# Patient Record
Sex: MALE | Race: WHITE | NOT HISPANIC OR LATINO
[De-identification: names, ages, dates, MRNs, and addresses within clinical notes are randomized per-mention and may not be internally consistent; named-entity substitution may affect disease eponyms.]

---

## 2016-11-15 RX ADMIN — Medication 650 MILLIGRAM(S): at 01:00

## 2017-01-25 ENCOUNTER — APPOINTMENT (OUTPATIENT)
Dept: ELECTROPHYSIOLOGY | Facility: CLINIC | Age: 82
End: 2017-01-25

## 2017-01-25 ENCOUNTER — NON-APPOINTMENT (OUTPATIENT)
Age: 82
End: 2017-01-25

## 2017-01-25 VITALS
DIASTOLIC BLOOD PRESSURE: 71 MMHG | OXYGEN SATURATION: 97 % | WEIGHT: 220 LBS | BODY MASS INDEX: 22.45 KG/M2 | HEART RATE: 60 BPM | SYSTOLIC BLOOD PRESSURE: 138 MMHG

## 2017-05-25 ENCOUNTER — APPOINTMENT (OUTPATIENT)
Dept: ELECTROPHYSIOLOGY | Facility: CLINIC | Age: 82
End: 2017-05-25

## 2017-09-27 ENCOUNTER — APPOINTMENT (OUTPATIENT)
Dept: ELECTROPHYSIOLOGY | Facility: CLINIC | Age: 82
End: 2017-09-27
Payer: MEDICARE

## 2017-09-27 ENCOUNTER — NON-APPOINTMENT (OUTPATIENT)
Age: 82
End: 2017-09-27

## 2017-09-27 VITALS — DIASTOLIC BLOOD PRESSURE: 63 MMHG | HEART RATE: 72 BPM | SYSTOLIC BLOOD PRESSURE: 149 MMHG

## 2017-09-27 PROCEDURE — 93279 PRGRMG DEV EVAL PM/LDLS PM: CPT

## 2018-01-29 ENCOUNTER — APPOINTMENT (OUTPATIENT)
Dept: ELECTROPHYSIOLOGY | Facility: CLINIC | Age: 83
End: 2018-01-29
Payer: MEDICARE

## 2018-01-29 PROCEDURE — 93293 PM PHONE R-STRIP DEVICE EVAL: CPT

## 2018-05-30 ENCOUNTER — APPOINTMENT (OUTPATIENT)
Dept: ELECTROPHYSIOLOGY | Facility: CLINIC | Age: 83
End: 2018-05-30
Payer: MEDICARE

## 2018-05-30 VITALS
WEIGHT: 225 LBS | HEIGHT: 69 IN | SYSTOLIC BLOOD PRESSURE: 155 MMHG | HEART RATE: 61 BPM | BODY MASS INDEX: 33.33 KG/M2 | DIASTOLIC BLOOD PRESSURE: 65 MMHG | OXYGEN SATURATION: 97 %

## 2018-05-30 PROCEDURE — 93279 PRGRMG DEV EVAL PM/LDLS PM: CPT

## 2018-09-06 ENCOUNTER — OUTPATIENT (OUTPATIENT)
Dept: OUTPATIENT SERVICES | Facility: HOSPITAL | Age: 83
LOS: 1 days | End: 2018-09-06
Payer: MEDICARE

## 2018-09-06 ENCOUNTER — APPOINTMENT (OUTPATIENT)
Dept: ELECTROPHYSIOLOGY | Facility: CLINIC | Age: 83
End: 2018-09-06
Payer: MEDICARE

## 2018-09-06 ENCOUNTER — APPOINTMENT (OUTPATIENT)
Dept: CV DIAGNOSITCS | Facility: HOSPITAL | Age: 83
End: 2018-09-06

## 2018-09-06 VITALS
DIASTOLIC BLOOD PRESSURE: 64 MMHG | OXYGEN SATURATION: 98 % | SYSTOLIC BLOOD PRESSURE: 154 MMHG | HEIGHT: 69 IN | WEIGHT: 225 LBS | HEART RATE: 66 BPM | BODY MASS INDEX: 33.33 KG/M2

## 2018-09-06 DIAGNOSIS — I48.91 UNSPECIFIED ATRIAL FIBRILLATION: ICD-10-CM

## 2018-09-06 PROCEDURE — 93306 TTE W/DOPPLER COMPLETE: CPT | Mod: 26

## 2018-09-06 PROCEDURE — 93279 PRGRMG DEV EVAL PM/LDLS PM: CPT

## 2018-09-06 PROCEDURE — 93306 TTE W/DOPPLER COMPLETE: CPT

## 2018-11-12 ENCOUNTER — INPATIENT (INPATIENT)
Facility: HOSPITAL | Age: 83
LOS: 6 days | Discharge: NURSING, REHAB OR LONG-TERM CARE | DRG: 682 | End: 2018-11-19
Attending: INTERNAL MEDICINE | Admitting: INTERNAL MEDICINE
Payer: MEDICARE

## 2018-11-12 VITALS
RESPIRATION RATE: 15 BRPM | TEMPERATURE: 100 F | HEIGHT: 69 IN | WEIGHT: 231.93 LBS | OXYGEN SATURATION: 95 % | DIASTOLIC BLOOD PRESSURE: 51 MMHG | SYSTOLIC BLOOD PRESSURE: 169 MMHG | HEART RATE: 78 BPM

## 2018-11-12 DIAGNOSIS — M62.82 RHABDOMYOLYSIS: ICD-10-CM

## 2018-11-12 LAB
ALBUMIN SERPL ELPH-MCNC: 3.2 G/DL — LOW (ref 3.3–5)
ALP SERPL-CCNC: 87 U/L — SIGNIFICANT CHANGE UP (ref 40–120)
ALT FLD-CCNC: 84 U/L — HIGH (ref 12–78)
ANION GAP SERPL CALC-SCNC: 8 MMOL/L — SIGNIFICANT CHANGE UP (ref 5–17)
APPEARANCE UR: ABNORMAL
APTT BLD: 38.2 SEC — HIGH (ref 27.5–36.3)
AST SERPL-CCNC: 213 U/L — HIGH (ref 15–37)
BACTERIA # UR AUTO: ABNORMAL
BASOPHILS # BLD AUTO: 0 K/UL — SIGNIFICANT CHANGE UP (ref 0–0.2)
BASOPHILS NFR BLD AUTO: 0 % — SIGNIFICANT CHANGE UP (ref 0–2)
BILIRUB SERPL-MCNC: 0.8 MG/DL — SIGNIFICANT CHANGE UP (ref 0.2–1.2)
BILIRUB UR-MCNC: ABNORMAL
BUN SERPL-MCNC: 48 MG/DL — HIGH (ref 7–23)
CALCIUM SERPL-MCNC: 7.9 MG/DL — LOW (ref 8.5–10.1)
CHLORIDE SERPL-SCNC: 100 MMOL/L — SIGNIFICANT CHANGE UP (ref 96–108)
CK MB BLD-MCNC: 0.2 % — SIGNIFICANT CHANGE UP (ref 0–3.5)
CK MB CFR SERPL CALC: 8 NG/ML — HIGH (ref 0–3.6)
CK SERPL-CCNC: 4655 U/L — HIGH (ref 26–308)
CO2 SERPL-SCNC: 27 MMOL/L — SIGNIFICANT CHANGE UP (ref 22–31)
COLOR SPEC: ABNORMAL
CREAT SERPL-MCNC: 1.9 MG/DL — HIGH (ref 0.5–1.3)
DIFF PNL FLD: ABNORMAL
EOSINOPHIL # BLD AUTO: 0 K/UL — SIGNIFICANT CHANGE UP (ref 0–0.5)
EOSINOPHIL NFR BLD AUTO: 0 % — SIGNIFICANT CHANGE UP (ref 0–6)
EPI CELLS # UR: SIGNIFICANT CHANGE UP
GLUCOSE SERPL-MCNC: 99 MG/DL — SIGNIFICANT CHANGE UP (ref 70–99)
GLUCOSE UR QL: NEGATIVE — SIGNIFICANT CHANGE UP
HCT VFR BLD CALC: 36 % — LOW (ref 39–50)
HGB BLD-MCNC: 12.1 G/DL — LOW (ref 13–17)
INR BLD: 2.08 RATIO — HIGH (ref 0.88–1.16)
KETONES UR-MCNC: ABNORMAL
LACTATE SERPL-SCNC: 1.5 MMOL/L — SIGNIFICANT CHANGE UP (ref 0.7–2)
LEUKOCYTE ESTERASE UR-ACNC: ABNORMAL
LYMPHOCYTES # BLD AUTO: 0.5 K/UL — LOW (ref 1–3.3)
LYMPHOCYTES # BLD AUTO: 4 % — LOW (ref 13–44)
MCHC RBC-ENTMCNC: 29.7 PG — SIGNIFICANT CHANGE UP (ref 27–34)
MCHC RBC-ENTMCNC: 33.6 GM/DL — SIGNIFICANT CHANGE UP (ref 32–36)
MCV RBC AUTO: 88.5 FL — SIGNIFICANT CHANGE UP (ref 80–100)
MONOCYTES # BLD AUTO: 0.63 K/UL — SIGNIFICANT CHANGE UP (ref 0–0.9)
MONOCYTES NFR BLD AUTO: 5 % — SIGNIFICANT CHANGE UP (ref 2–14)
NEUTROPHILS # BLD AUTO: 11.48 K/UL — HIGH (ref 1.8–7.4)
NEUTROPHILS NFR BLD AUTO: 51 % — SIGNIFICANT CHANGE UP (ref 43–77)
NITRITE UR-MCNC: NEGATIVE — SIGNIFICANT CHANGE UP
PH UR: 5 — SIGNIFICANT CHANGE UP (ref 5–8)
PLATELET # BLD AUTO: 143 K/UL — LOW (ref 150–400)
POTASSIUM SERPL-MCNC: 4 MMOL/L — SIGNIFICANT CHANGE UP (ref 3.5–5.3)
POTASSIUM SERPL-SCNC: 4 MMOL/L — SIGNIFICANT CHANGE UP (ref 3.5–5.3)
PROT SERPL-MCNC: 7.5 G/DL — SIGNIFICANT CHANGE UP (ref 6–8.3)
PROT UR-MCNC: 25 MG/DL
PROTHROM AB SERPL-ACNC: 24 SEC — HIGH (ref 10–12.9)
RBC # BLD: 4.07 M/UL — LOW (ref 4.2–5.8)
RBC # FLD: 14.1 % — SIGNIFICANT CHANGE UP (ref 10.3–14.5)
RBC CASTS # UR COMP ASSIST: SIGNIFICANT CHANGE UP /HPF (ref 0–4)
SODIUM SERPL-SCNC: 135 MMOL/L — SIGNIFICANT CHANGE UP (ref 135–145)
SP GR SPEC: 1.02 — SIGNIFICANT CHANGE UP (ref 1.01–1.02)
UROBILINOGEN FLD QL: 1
WBC # BLD: 12.62 K/UL — HIGH (ref 3.8–10.5)
WBC # FLD AUTO: 12.62 K/UL — HIGH (ref 3.8–10.5)
WBC UR QL: SIGNIFICANT CHANGE UP

## 2018-11-12 PROCEDURE — 99285 EMERGENCY DEPT VISIT HI MDM: CPT

## 2018-11-12 PROCEDURE — 73522 X-RAY EXAM HIPS BI 3-4 VIEWS: CPT | Mod: 26

## 2018-11-12 PROCEDURE — 99223 1ST HOSP IP/OBS HIGH 75: CPT

## 2018-11-12 PROCEDURE — 72131 CT LUMBAR SPINE W/O DYE: CPT | Mod: 26

## 2018-11-12 PROCEDURE — 72110 X-RAY EXAM L-2 SPINE 4/>VWS: CPT | Mod: 26

## 2018-11-12 PROCEDURE — 93010 ELECTROCARDIOGRAM REPORT: CPT

## 2018-11-12 PROCEDURE — 71045 X-RAY EXAM CHEST 1 VIEW: CPT | Mod: 26

## 2018-11-12 PROCEDURE — 74176 CT ABD & PELVIS W/O CONTRAST: CPT | Mod: 26

## 2018-11-12 RX ORDER — PANTOPRAZOLE SODIUM 20 MG/1
40 TABLET, DELAYED RELEASE ORAL DAILY
Qty: 0 | Refills: 0 | Status: DISCONTINUED | OUTPATIENT
Start: 2018-11-12 | End: 2018-11-19

## 2018-11-12 RX ORDER — ACETAMINOPHEN 500 MG
650 TABLET ORAL ONCE
Qty: 0 | Refills: 0 | Status: COMPLETED | OUTPATIENT
Start: 2018-11-12 | End: 2018-11-12

## 2018-11-12 RX ORDER — ACETAMINOPHEN 500 MG
650 TABLET ORAL EVERY 6 HOURS
Qty: 0 | Refills: 0 | Status: DISCONTINUED | OUTPATIENT
Start: 2018-11-12 | End: 2018-11-19

## 2018-11-12 RX ORDER — CEFTRIAXONE 500 MG/1
1 INJECTION, POWDER, FOR SOLUTION INTRAMUSCULAR; INTRAVENOUS ONCE
Qty: 0 | Refills: 0 | Status: COMPLETED | OUTPATIENT
Start: 2018-11-12 | End: 2018-11-12

## 2018-11-12 RX ORDER — SODIUM CHLORIDE 9 MG/ML
1000 INJECTION INTRAMUSCULAR; INTRAVENOUS; SUBCUTANEOUS ONCE
Qty: 0 | Refills: 0 | Status: COMPLETED | OUTPATIENT
Start: 2018-11-12 | End: 2018-11-12

## 2018-11-12 RX ORDER — SODIUM CHLORIDE 9 MG/ML
1000 INJECTION INTRAMUSCULAR; INTRAVENOUS; SUBCUTANEOUS
Qty: 0 | Refills: 0 | Status: DISCONTINUED | OUTPATIENT
Start: 2018-11-12 | End: 2018-11-13

## 2018-11-12 RX ORDER — LIDOCAINE 4 G/100G
1 CREAM TOPICAL ONCE
Qty: 0 | Refills: 0 | Status: COMPLETED | OUTPATIENT
Start: 2018-11-12 | End: 2018-11-12

## 2018-11-12 RX ORDER — ENOXAPARIN SODIUM 100 MG/ML
30 INJECTION SUBCUTANEOUS DAILY
Qty: 0 | Refills: 0 | Status: DISCONTINUED | OUTPATIENT
Start: 2018-11-12 | End: 2018-11-12

## 2018-11-12 RX ORDER — ENOXAPARIN SODIUM 100 MG/ML
40 INJECTION SUBCUTANEOUS DAILY
Qty: 0 | Refills: 0 | Status: DISCONTINUED | OUTPATIENT
Start: 2018-11-12 | End: 2018-11-19

## 2018-11-12 RX ADMIN — SODIUM CHLORIDE 1000 MILLILITER(S): 9 INJECTION INTRAMUSCULAR; INTRAVENOUS; SUBCUTANEOUS at 16:50

## 2018-11-12 RX ADMIN — SODIUM CHLORIDE 1000 MILLILITER(S): 9 INJECTION INTRAMUSCULAR; INTRAVENOUS; SUBCUTANEOUS at 18:29

## 2018-11-12 RX ADMIN — CEFTRIAXONE 100 GRAM(S): 500 INJECTION, POWDER, FOR SOLUTION INTRAMUSCULAR; INTRAVENOUS at 18:29

## 2018-11-12 RX ADMIN — Medication 650 MILLIGRAM(S): at 18:06

## 2018-11-12 RX ADMIN — LIDOCAINE 1 PATCH: 4 CREAM TOPICAL at 22:38

## 2018-11-12 RX ADMIN — Medication 650 MILLIGRAM(S): at 16:49

## 2018-11-12 RX ADMIN — CEFTRIAXONE 1 GRAM(S): 500 INJECTION, POWDER, FOR SOLUTION INTRAMUSCULAR; INTRAVENOUS at 18:59

## 2018-11-12 RX ADMIN — SODIUM CHLORIDE 1000 MILLILITER(S): 9 INJECTION INTRAMUSCULAR; INTRAVENOUS; SUBCUTANEOUS at 16:52

## 2018-11-12 RX ADMIN — SODIUM CHLORIDE 1000 MILLILITER(S): 9 INJECTION INTRAMUSCULAR; INTRAVENOUS; SUBCUTANEOUS at 17:52

## 2018-11-12 RX ADMIN — SODIUM CHLORIDE 1000 MILLILITER(S): 9 INJECTION INTRAMUSCULAR; INTRAVENOUS; SUBCUTANEOUS at 17:29

## 2018-11-12 RX ADMIN — SODIUM CHLORIDE 200 MILLILITER(S): 9 INJECTION INTRAMUSCULAR; INTRAVENOUS; SUBCUTANEOUS at 18:52

## 2018-11-12 RX ADMIN — SODIUM CHLORIDE 1000 MILLILITER(S): 9 INJECTION INTRAMUSCULAR; INTRAVENOUS; SUBCUTANEOUS at 17:50

## 2018-11-12 RX ADMIN — LIDOCAINE 1 PATCH: 4 CREAM TOPICAL at 16:50

## 2018-11-12 NOTE — CONSULT NOTE ADULT - ASSESSMENT
83 M with a history of HTN, AF on AC, PPM, BPH, chronic venous stasis p/w rhabdo and abn EKG.     - Jm are more indicative of peripheral muscle breakdown.   - Cont IVF  - No signs of ADHF. Lower ext edema chronic.   - Monitor closely for vol ol  - Check cxr    - Currently EKG with AF wtih Diffues TWI.   - Previous EKG is .   - Trend Jm to make sure no signs of plaque rupture.   - Likely EKG changes may be from post pacing T wave changes (cardiac memory)    - Hold off on diuretics.   - Would insert vazquez    - Hold BP meds for now.     - Please continue to maintain strict I/Os, monitor daily weights, Cr, and K.   - Cont Abx.   - Further cardiac workup will depend on clinical course.   - All other workup per primary team. Will followup.

## 2018-11-12 NOTE — H&P ADULT - NSHPREVIEWOFSYSTEMS_GEN_ALL_CORE
awake , denies head aches , complain of back pain , and leg pain and being achy and sob , no n/v , no abd pain , mild sob , no chills , feels cold

## 2018-11-12 NOTE — ED ADULT TRIAGE NOTE - CHIEF COMPLAINT QUOTE
Pt states' I fell on Saturday night and laid on the floor till the next morning. Pt have low back pain.

## 2018-11-12 NOTE — CONSULT NOTE ADULT - ASSESSMENT
83M with PMHx as above, s/p fall, remained on floor overnight, now with vincenzo, rhabdomyolysis. Pt is HD stable, No distress    -No acute ICU needs at this time  -Medical management as per PMD  -Trend CPK's  -Monitor renal function  -Reconsult as needed

## 2018-11-12 NOTE — ED ADULT NURSE NOTE - NSIMPLEMENTINTERV_GEN_ALL_ED
Implemented All Fall with Harm Risk Interventions:  [place] to call system. Call bell, personal items and telephone within reach. Instruct patient to call for assistance. Room bathroom lighting operational. Non-slip footwear when patient is off stretcher. Physically safe environment: no spills, clutter or unnecessary equipment. Stretcher in lowest position, wheels locked, appropriate side rails in place. Provide visual cue, wrist band, yellow gown, etc. Monitor gait and stability. Monitor for mental status changes and reorient to person, place, and time. Review medications for side effects contributing to fall risk. Reinforce activity limits and safety measures with patient and family. Provide visual clues: red socks.

## 2018-11-12 NOTE — CONSULT NOTE ADULT - SUBJECTIVE AND OBJECTIVE BOX
CHIEF COMPLAINT: Patient is a 83y old  Male who presents with a chief complaint of fall abd back pain    HPI: 83 M with a history of HTN, AF on AC, PPM, BPH, chronic venous stasis p/w back pain. He was noted to have had a mechanical fall 2 days ago. He was sitting on the floor for 10-12 hours because of back pain. Then his back pain worsened and he came to ED. He has had chropnic lower ext edema that has not worsened. Denies any chest pain, dyspnea, palpitations, PND, orthopnea, near syncope, syncope, stroke like symptoms. Currently feeling anxious.     EKG: AF with diffuse T wave inversions    REVIEW OF SYSTEMS:   All other review of systems are negative unless indicated above    PAST MEDICAL & SURGICAL HISTORY:  Pacemaker  Atrial fibrillation  BPH (benign prostatic hypertrophy)  Hypertension  repair of fracture of right wrist with hardware  Cardiac pacemaker      SOCIAL HISTORY:  No tobacco, ethanol, or drug abuse.    FAMILY HISTORY:    No family history of acute MI or sudden cardiac death.    MEDICATIONS  (STANDING):  cefTRIAXone   IVPB 1 Gram(s) IV Intermittent Once  sodium chloride 0.9% Bolus 1000 milliLiter(s) IV Bolus once  sodium chloride 0.9%. 1000 milliLiter(s) (200 mL/Hr) IV Continuous <Continuous>    MEDICATIONS  (PRN):      Allergies    lactose (Diarrhea)  penicillin (Hives)    Intolerances        Home meds:  Home Medications:  amlodipine-benazepril 10 mg-20 mg oral capsule: 1 cap(s) orally once a day (12 Nov 2018 15:12)  terazosin 5 mg oral tablet: orally once a day (12 Nov 2018 15:12)  warfarin 4 mg oral tablet: 1 tab(s) orally once a day (12 Nov 2018 15:12)        VITAL SIGNS:   Vital Signs Last 24 Hrs  T(C): 37.9 (12 Nov 2018 15:04), Max: 37.9 (12 Nov 2018 15:04)  T(F): 100.3 (12 Nov 2018 15:04), Max: 100.3 (12 Nov 2018 15:04)  HR: 61 (12 Nov 2018 16:25) (61 - 78)  BP: 123/53 (12 Nov 2018 16:25) (123/53 - 169/51)  BP(mean): --  RR: 20 (12 Nov 2018 16:25) (15 - 20)  SpO2: 94% (12 Nov 2018 16:25) (94% - 95%)    I&O's Summary      On Exam:    Constitutional: NAD, awake   HEENT: Dry Mucous Membranes, Anicteric  Pulmonary: Decreased breath sounds b/l. No rales, crackles or wheeze appreciated.   Cardiovascular: Regular, S1 and S2, No murmurs, rubs, gallops or clicks  Gastrointestinal: Bowel Sounds present, soft, nontender. distended  Lymph: 2+ peripheral edema. No lymphadenopathy.  Skin: No visible rashes or ulcers.  Psych:  Mood & affect appropriate    LABS: All Labs Reviewed:                        12.1 12.62 )-----------( 143      ( 12 Nov 2018 16:27 )             36.0     12 Nov 2018 16:27    135    |  100    |  48     ----------------------------<  99     4.0     |  27     |  1.90     Ca    7.9        12 Nov 2018 16:27    TPro  7.5    /  Alb  3.2    /  TBili  0.8    /  DBili  x      /  AST  213    /  ALT  84     /  AlkPhos  87     12 Nov 2018 16:27    PT/INR - ( 12 Nov 2018 16:27 )   PT: 24.0 sec;   INR: 2.08 ratio         PTT - ( 12 Nov 2018 16:27 )  PTT:38.2 sec  CARDIAC MARKERS ( 12 Nov 2018 16:27 )  .102 ng/mL / x     / 4655 U/L / x     / 8.0 ng/mL      Blood Culture:         RADIOLOGY:    < from: Transthoracic Echocardiogram (09.06.18 @ 10:13) >    Patient name: LACIE SANCHEZ  YOB: 1935   Age: 83 (M)   MR#: [id number]  Study Date: 9/6/2018  Location: O/PSonographer: Annamarie Franks [facility]  Study quality: Technically fair  Referring Physician: JELENA TALBOT MD  Blood Pressure: 151/58 mmHg  Height: 173 cm  Weight: 103 kg  BSA: 2.2 m2  Heart Rate: 80 mmHg  ------------------------------------------------------------------------  PROCEDURE: Transthoracic echocardiogram with 2-D, M-Mode  and complete spectral and color flow Doppler.  INDICATION: Unspecified atrial fibrillation (I48.91)  ------------------------------------------------------------------------  Dimensions:    Normal Values:  LA:     5.0    2.0 - 4.0 cm  Ao:     3.4    2.0 - 3.8 cm  SEPTUM: 1.0    0.6 - 1.2 cm  PWT:    0.9    0.6 - 1.1 cm  LVIDd:  5.2    3.0 - 5.6 cm  LVIDs:  2.6    1.8 - 4.0 cm  Derived variables:  LVMI: 84 g/m2  RWT: 0.34  Fractional short: 50 %  EF (Visual Estimate): 60-65 %  EF (Modified Godinez Rule): 61 %Doppler Peak Velocity  (m/sec): AoV=1.6  ------------------------------------------------------------------------  Observations:  Mitral Valve: Mitral annular calcification, otherwise  normal mitral valve. Minimal mitral regurgitation.  Aortic Valve/Aorta: Aortic valve not well visualized;  appears calcified. Peak transaortic valve gradient equals  10 mm Hg. Minimal aortic regurgitation.  Peak left  ventricular outflow tract gradient equals 5 mm Hg.  Aortic Root: 3.4 cm.  Left Atrium: Severely dilated left atrium.  LA volume index  = 50 cc/m2.  Left Ventricle: Endocardium not well visualized; grossly  normal left ventricular systolic function. Normal left  ventricular internal dimensions and wall thicknesses.  Normal diastolic function  Right Heart: Right atrium not well visualized. The right  ventricle is not well visualized; grossly normal right  ventricular systolic function. A device wire is noted in  the right heart. Normal tricuspid valve. Mild tricuspid  regurgitation. Pulmonic valve not well visualized. Minimal  pulmonic regurgitation.  Pericardium/Pleura: Normal pericardium with trace  pericardial effusion.  Hemodynamic: Estimated right atrial pressure is 8 mm Hg.  Estimated right ventricular systolic pressure equals 49 mm  Hg, assuming right atrial pressure equals 8 mm Hg,  consistent with mild pulmonary hypertension.  ------------------------------------------------------------------------  Conclusions:  1. Mitral annular calcification, otherwise normal mitral  valve. Minimal mitral regurgitation.  2. Severely dilated left atrium.  LA volume index = 50  cc/m2.  3. Normal left ventricular internal dimensions and wall  thicknesses.  4. Endocardium not well visualized; grossly normal left  ventricular systolic function.  5. The right ventricle is not well visualized; grossly  normal right ventricular systolic function. A device wire  is noted in the right heart.  6. Estimated right ventricular systolic pressure equals 49  mm Hg, assuming right atrial pressure equals 8 mm Hg,  consistent with mild pulmonary hypertension.  *** Compared with echocardiogram of 9/9/2015, no  significant changes noted.  ------------------------------------------------------------------------  Confirmed on  9/6/2018 - 14:32:07 by Byron Garcia M.D.  ------------------------------------------------------------------------    < end of copied text >

## 2018-11-12 NOTE — H&P ADULT - ASSESSMENT
· Reason for Referral/Consultation:	elevated CPK, abn ekg	      · Subjective and Objective: 	    CHIEF COMPLAINT: Patient is a 83y old  Male who presents with a chief complaint of fall abd back pain    HPI: 83 M with a history of HTN, AF on AC, PPM, BPH, chronic venous stasis p/w back pain. He was noted to have had a mechanical fall 2 days ago. He was sitting on the floor for 10-12 hours because of back pain. Then his back pain worsened and he came to ED. He has had chronic lower ext edema that has not worsened. Denies any chest pain, dyspnea, palpitations, PND, orthopnea, near syncope, syncope, stroke like symptoms. Currently feeling anxious.     EKG: AF with diffuse T wave inversions    REVIEW OF SYSTEMS:   All other review of systems are negative unless indicated above    PAST MEDICAL & SURGICAL HISTORY:  Pacemaker  Atrial fibrillation  BPH (benign prostatic hypertrophy)  Hypertension  repair of fracture of right wrist with hardware  Cardiac pacemaker      admit for rhabdomyolysis , post mechanical; fall , and iv hydration , and monitor , icu eval , f/i cpk ,   fever ? etiology , iv abx , pan culture ,   dvt and gi prophylax

## 2018-11-12 NOTE — ED PROVIDER NOTE - PHYSICAL EXAMINATION
Spine Exam:     Cervical: No erythema, ecchymosis, or visible deformity. No midline tenderness or step-off appreciated on palpation. No paravertebral tenderness. No muscle spasm. FROM. NEG NEXUS criteria.          Thoracic: No erythema, ecchymosis, or visible deformity. No midline tenderness or step-off appreciated on palpation. No paravertebral tenderness. No muscle spasm.           Lumbosacral: No erythema, ecchymosis, or visible deformity. No midline tenderness or step-off appreciated on palpation. No paravertebral tenderness. No muscle spasm.   FROM BL LE. SENSATION GROSSLY INTACT X2.

## 2018-11-12 NOTE — ED PROVIDER NOTE - OBJECTIVE STATEMENT
pt is a 84yo male with pmhx of a-fib with pacemaker on coumadin, and htn c/o fall x days. pt reports he fell on 11/10 on his buttock. pt reports he was unable to get up so he stayed on the floor overnight. pt reports he was able to get up in the am and call his daughter, jessenia. pt reports he has had a "stomach virus" with minimal diarrhea for 2 weeks. pt ate today without issue. pt reports lower back and left hip pain. pt reports no head injury or loc. pt reports he is very nervous due to pain.   pmd : frederic knott

## 2018-11-12 NOTE — ED PROVIDER NOTE - ATTENDING CONTRIBUTION TO CARE
Pt is a 84 yo male who presents to the ED with a cc of myalgia and weakness.  PMHx of of a-fib with pacemaker on coumadin, and htn c/o fall x days. Pt reports he fell on 11/10 on his buttock. pt reports he was unable to get up so he stayed on the floor overnight. pt reports he was able to get up in the am and call his daughter, jessenia.  He reports severe pain and generalized weakness since the injury and is having difficulty ambulating.  On exam pt lying in bed mild pain distress, NCAT, PERRL, EOMI, heart irregular rate and rhythm with systolic murmur, PPM noted to left upper chest, lungs diminished at the bases, abd soft NT/ND, diffuse pain noted to the back with no overlying deformity noted.  Will obtain screening labs, CPK, troponin, EKG, and imaging of chest, head, back.  Agree with above plan of care

## 2018-11-12 NOTE — ED ADULT NURSE NOTE - OBJECTIVE STATEMENT
Pt from home complains of pain due to mechanical fall, pt is alert and oriented, labs drawn,   IV placed. Pain medication ordered. Daughter at bedside. Cardiac monitor on paced.

## 2018-11-12 NOTE — ED PROVIDER NOTE - PROGRESS NOTE DETAILS
dr mendoza cardio will eval pt in ed. advised ekg changes are due to paced rhythm. will order ct abd/pelvis and l-spine. will give fluids for rhabdo. will re-evla pt with decreased urine out pt. will give ivf and abx for elevated bands. will admit pending results. all results reviewed. dr tijerina will admit to dr knott. will consult dr leos nephneal and icu. abnormal findings discussed with dr tijeirna.

## 2018-11-12 NOTE — H&P ADULT - MUSCULOSKELETAL
detailed exam decreased ROM/joint erythema/joint warmth/diminished strength/decreased ROM due to pain

## 2018-11-12 NOTE — H&P ADULT - NSHPLABSRESULTS_GEN_ALL_CORE
< from: Transthoracic Echocardiogram (09.06.18 @ 10:13) >    1. Mitral annular calcification, otherwise normal mitral  valve. Minimal mitral regurgitation.  2. Severely dilated left atrium.  LA volume index = 50  cc/m2.  3. Normal left ventricular internal dimensions and wall  thicknesses.  4. Endocardium not well visualized; grossly normal left  ventricular systolic function.  5. The right ventricle is not well visualized; grossly  normal right ventricular systolic function. A device wire  is noted in the right heart.  6. Estimated right ventricular systolic pressure equals 49  mm Hg, assuming right atrial pressure equals 8 mm Hg,  consistent with mild pulmonary hypertension.  *** Compared with echocardiogram of 9/9/2015, no  significant changes noted.  ------------------------------------------------------------------------  Confirmed on  9/6/2018 - 14:32:07 by Byron Garcia M.D.      < from: CT Abdomen and Pelvis No Cont (11.12.18 @ 17:54) >      IMPRESSION:    CT abdomen and pelvis:  1. Limited examination secondary to exclusion of IV contrast.  2. No retroperitoneal hematoma.  3. Nonspecific enlarged bilateral groin lymph nodes, some of which appear   necrotic. There is adjacent nonspecific fat stranding. A neoplastic or   infectious process is not excluded.  4. Small pericardial effusion.  5. Cholelithiasis.    CT lumbarspine:  1. No acute fractures or dislocations.  2. Multilevel lumbar spondylosis.          < end of copied text >

## 2018-11-12 NOTE — H&P ADULT - HISTORY OF PRESENT ILLNESS
· Reason for Referral/Consultation:	elevated CPK, abn ekg	      · Subjective and Objective: 	    CHIEF COMPLAINT: Patient is a 83y old  Male who presents with a chief complaint of fall abd back pain    HPI: 83 M with a history of HTN, AF on AC, PPM, BPH, chronic venous stasis p/w back pain. He was noted to have had a mechanical fall 2 days ago. He was sitting on the floor for 10-12 hours because of back pain. Then his back pain worsened and he came to ED. He has had chronic lower ext edema that has not worsened. Denies any chest pain, dyspnea, palpitations, PND, orthopnea, near syncope, syncope, stroke like symptoms. Currently feeling anxious.     EKG: AF with diffuse T wave inversions    REVIEW OF SYSTEMS:   All other review of systems are negative unless indicated above    PAST MEDICAL & SURGICAL HISTORY:  Pacemaker  Atrial fibrillation  BPH (benign prostatic hypertrophy)  Hypertension  repair of fracture of right wrist with hardware  Cardiac pacemaker

## 2018-11-13 DIAGNOSIS — J18.1 LOBAR PNEUMONIA, UNSPECIFIED ORGANISM: ICD-10-CM

## 2018-11-13 DIAGNOSIS — I48.91 UNSPECIFIED ATRIAL FIBRILLATION: ICD-10-CM

## 2018-11-13 LAB
ANION GAP SERPL CALC-SCNC: 9 MMOL/L — SIGNIFICANT CHANGE UP (ref 5–17)
BASOPHILS # BLD AUTO: 0 K/UL — SIGNIFICANT CHANGE UP (ref 0–0.2)
BASOPHILS NFR BLD AUTO: 0 % — SIGNIFICANT CHANGE UP (ref 0–2)
BUN SERPL-MCNC: 42 MG/DL — HIGH (ref 7–23)
CALCIUM SERPL-MCNC: 7.1 MG/DL — LOW (ref 8.5–10.1)
CHLORIDE SERPL-SCNC: 106 MMOL/L — SIGNIFICANT CHANGE UP (ref 96–108)
CK SERPL-CCNC: 2785 U/L — HIGH (ref 26–308)
CO2 SERPL-SCNC: 23 MMOL/L — SIGNIFICANT CHANGE UP (ref 22–31)
CREAT SERPL-MCNC: 1.3 MG/DL — SIGNIFICANT CHANGE UP (ref 0.5–1.3)
CULTURE RESULTS: NO GROWTH — SIGNIFICANT CHANGE UP
EOSINOPHIL # BLD AUTO: 0 K/UL — SIGNIFICANT CHANGE UP (ref 0–0.5)
EOSINOPHIL NFR BLD AUTO: 0 % — SIGNIFICANT CHANGE UP (ref 0–6)
GLUCOSE SERPL-MCNC: 94 MG/DL — SIGNIFICANT CHANGE UP (ref 70–99)
GP B STREP DNA BLD POS QL NAA+NON-PROBE: SIGNIFICANT CHANGE UP
GRAM STN FLD: SIGNIFICANT CHANGE UP
HCT VFR BLD CALC: 35.1 % — LOW (ref 39–50)
HGB BLD-MCNC: 11.4 G/DL — LOW (ref 13–17)
HYPOSEGMENTATION: PRESENT — SIGNIFICANT CHANGE UP
INR BLD: 2.64 RATIO — HIGH (ref 0.88–1.16)
LYMPHOCYTES # BLD AUTO: 0.82 K/UL — LOW (ref 1–3.3)
LYMPHOCYTES # BLD AUTO: 7 % — LOW (ref 13–44)
MANUAL SMEAR VERIFICATION: SIGNIFICANT CHANGE UP
MCHC RBC-ENTMCNC: 29.5 PG — SIGNIFICANT CHANGE UP (ref 27–34)
MCHC RBC-ENTMCNC: 32.5 GM/DL — SIGNIFICANT CHANGE UP (ref 32–36)
MCV RBC AUTO: 90.7 FL — SIGNIFICANT CHANGE UP (ref 80–100)
METHOD TYPE: SIGNIFICANT CHANGE UP
MONOCYTES # BLD AUTO: 1.05 K/UL — HIGH (ref 0–0.9)
MONOCYTES NFR BLD AUTO: 9 % — SIGNIFICANT CHANGE UP (ref 2–14)
NEUTROPHILS # BLD AUTO: 9.84 K/UL — HIGH (ref 1.8–7.4)
NEUTROPHILS NFR BLD AUTO: 69 % — SIGNIFICANT CHANGE UP (ref 43–77)
NEUTS BAND # BLD: 15 % — HIGH (ref 0–8)
NRBC # BLD: 0 — SIGNIFICANT CHANGE UP
NRBC # BLD: SIGNIFICANT CHANGE UP /100 WBCS (ref 0–0)
PLAT MORPH BLD: NORMAL — SIGNIFICANT CHANGE UP
PLATELET # BLD AUTO: 130 K/UL — LOW (ref 150–400)
POTASSIUM SERPL-MCNC: 3.8 MMOL/L — SIGNIFICANT CHANGE UP (ref 3.5–5.3)
POTASSIUM SERPL-SCNC: 3.8 MMOL/L — SIGNIFICANT CHANGE UP (ref 3.5–5.3)
PROTHROM AB SERPL-ACNC: 30.7 SEC — HIGH (ref 10–12.9)
RBC # BLD: 3.87 M/UL — LOW (ref 4.2–5.8)
RBC # FLD: 14.4 % — SIGNIFICANT CHANGE UP (ref 10.3–14.5)
RBC BLD AUTO: SIGNIFICANT CHANGE UP
SODIUM SERPL-SCNC: 138 MMOL/L — SIGNIFICANT CHANGE UP (ref 135–145)
SPECIMEN SOURCE: SIGNIFICANT CHANGE UP
WBC # BLD: 11.71 K/UL — HIGH (ref 3.8–10.5)
WBC # FLD AUTO: 11.71 K/UL — HIGH (ref 3.8–10.5)

## 2018-11-13 PROCEDURE — 99232 SBSQ HOSP IP/OBS MODERATE 35: CPT

## 2018-11-13 PROCEDURE — 76775 US EXAM ABDO BACK WALL LIM: CPT | Mod: 26

## 2018-11-13 PROCEDURE — 71045 X-RAY EXAM CHEST 1 VIEW: CPT | Mod: 26

## 2018-11-13 RX ORDER — FUROSEMIDE 40 MG
20 TABLET ORAL ONCE
Qty: 0 | Refills: 0 | Status: COMPLETED | OUTPATIENT
Start: 2018-11-13 | End: 2018-11-13

## 2018-11-13 RX ORDER — ALBUTEROL 90 UG/1
1 AEROSOL, METERED ORAL EVERY 4 HOURS
Qty: 0 | Refills: 0 | Status: DISCONTINUED | OUTPATIENT
Start: 2018-11-13 | End: 2018-11-19

## 2018-11-13 RX ORDER — TIOTROPIUM BROMIDE 18 UG/1
1 CAPSULE ORAL; RESPIRATORY (INHALATION) DAILY
Qty: 0 | Refills: 0 | Status: DISCONTINUED | OUTPATIENT
Start: 2018-11-13 | End: 2018-11-19

## 2018-11-13 RX ORDER — DOXAZOSIN MESYLATE 4 MG
4 TABLET ORAL AT BEDTIME
Qty: 0 | Refills: 0 | Status: DISCONTINUED | OUTPATIENT
Start: 2018-11-13 | End: 2018-11-19

## 2018-11-13 RX ORDER — CEFTRIAXONE 500 MG/1
1 INJECTION, POWDER, FOR SOLUTION INTRAMUSCULAR; INTRAVENOUS EVERY 24 HOURS
Qty: 0 | Refills: 0 | Status: DISCONTINUED | OUTPATIENT
Start: 2018-11-13 | End: 2018-11-19

## 2018-11-13 RX ORDER — FUROSEMIDE 40 MG
40 TABLET ORAL ONCE
Qty: 0 | Refills: 0 | Status: COMPLETED | OUTPATIENT
Start: 2018-11-13 | End: 2018-11-13

## 2018-11-13 RX ORDER — LIDOCAINE 4 G/100G
1 CREAM TOPICAL DAILY
Qty: 0 | Refills: 0 | Status: DISCONTINUED | OUTPATIENT
Start: 2018-11-13 | End: 2018-11-19

## 2018-11-13 RX ORDER — SODIUM CHLORIDE 9 MG/ML
1000 INJECTION INTRAMUSCULAR; INTRAVENOUS; SUBCUTANEOUS
Qty: 0 | Refills: 0 | Status: DISCONTINUED | OUTPATIENT
Start: 2018-11-13 | End: 2018-11-13

## 2018-11-13 RX ORDER — IPRATROPIUM/ALBUTEROL SULFATE 18-103MCG
3 AEROSOL WITH ADAPTER (GRAM) INHALATION EVERY 6 HOURS
Qty: 0 | Refills: 0 | Status: DISCONTINUED | OUTPATIENT
Start: 2018-11-13 | End: 2018-11-19

## 2018-11-13 RX ORDER — IPRATROPIUM/ALBUTEROL SULFATE 18-103MCG
3 AEROSOL WITH ADAPTER (GRAM) INHALATION ONCE
Qty: 0 | Refills: 0 | Status: COMPLETED | OUTPATIENT
Start: 2018-11-13 | End: 2018-11-13

## 2018-11-13 RX ADMIN — Medication 40 MILLIGRAM(S): at 15:36

## 2018-11-13 RX ADMIN — Medication 3 MILLILITER(S): at 14:51

## 2018-11-13 RX ADMIN — PANTOPRAZOLE SODIUM 40 MILLIGRAM(S): 20 TABLET, DELAYED RELEASE ORAL at 11:19

## 2018-11-13 RX ADMIN — LIDOCAINE 1 PATCH: 4 CREAM TOPICAL at 23:32

## 2018-11-13 RX ADMIN — LIDOCAINE 1 PATCH: 4 CREAM TOPICAL at 11:45

## 2018-11-13 RX ADMIN — CEFTRIAXONE 100 GRAM(S): 500 INJECTION, POWDER, FOR SOLUTION INTRAMUSCULAR; INTRAVENOUS at 20:46

## 2018-11-13 RX ADMIN — Medication 650 MILLIGRAM(S): at 20:46

## 2018-11-13 RX ADMIN — LIDOCAINE 1 PATCH: 4 CREAM TOPICAL at 20:45

## 2018-11-13 RX ADMIN — Medication 4 MILLIGRAM(S): at 20:46

## 2018-11-13 RX ADMIN — ENOXAPARIN SODIUM 40 MILLIGRAM(S): 100 INJECTION SUBCUTANEOUS at 11:19

## 2018-11-13 RX ADMIN — Medication 3 MILLILITER(S): at 20:36

## 2018-11-13 RX ADMIN — Medication 20 MILLIGRAM(S): at 11:45

## 2018-11-13 RX ADMIN — Medication 650 MILLIGRAM(S): at 21:00

## 2018-11-13 RX ADMIN — LIDOCAINE 1 PATCH: 4 CREAM TOPICAL at 04:00

## 2018-11-13 NOTE — PROVIDER CONTACT NOTE (CRITICAL VALUE NOTIFICATION) - TEST AND RESULT REPORTED:
Growth in aerobic and anaerobic bottles, first set has gram + cocci in pairs and chains in both aerobic and anaerobic. Second set has gram + cocci in pairs and chains in only the aerobic bottle

## 2018-11-13 NOTE — PHYSICAL THERAPY INITIAL EVALUATION ADULT - PERTINENT HX OF CURRENT PROBLEM, REHAB EVAL
HPI: 83 M with a history of HTN, AF on AC, PPM, BPH, chronic venous stasis p/w back pain. He was noted to have had a mechanical fall. He was sitting on the floor for 10-12 hours because of back pain. Then his back pain worsened and he came to ED. He has had chronic lower ext edema that has not worsened. Denies any chest pain, dyspnea, palpitations, orthopnea, near syncope, syncope, stroke like symptoms. Currently feeling anxious.

## 2018-11-13 NOTE — PROGRESS NOTE ADULT - SUBJECTIVE AND OBJECTIVE BOX
pt  seen on rounds  daughter  at  bedside  vss lungs exp  wheeze  cor  irregular rate abdomen  soft  extremiities no  edema

## 2018-11-13 NOTE — PROGRESS NOTE ADULT - SUBJECTIVE AND OBJECTIVE BOX
[facility] - Shar Thomas, Ananya, Latesha, Pilo, Zakia Weinstein  Office Number:  [phone]    Patient resting comfortably in bed in NAD.  Laying flat with no respiratory distress.  No complaints of chest pain, dyspnea, palpitations, PND, or orthopnea.    F/U for:  AF    Telemetry:   V paced     MEDICATIONS  (STANDING):  enoxaparin Injectable 40 milliGRAM(s) SubCutaneous daily  pantoprazole  Injectable 40 milliGRAM(s) IV Push daily  sodium chloride 0.9%. 1000 milliLiter(s) (200 mL/Hr) IV Continuous <Continuous>  sodium chloride 0.9%. 1000 milliLiter(s) (125 mL/Hr) IV Continuous <Continuous>    MEDICATIONS  (PRN):  acetaminophen   Tablet .. 650 milliGRAM(s) Oral every 6 hours PRN Temp greater or equal to 38C (100.4F), Mild Pain (1 - 3)      Allergies    lactose (Diarrhea)  penicillin (Hives)            Vital Signs Last 24 Hrs  T(C): 36.8 (13 Nov 2018 07:12), Max: 37.9 (12 Nov 2018 15:04)  T(F): 98.2 (13 Nov 2018 07:12), Max: 100.3 (12 Nov 2018 15:04)  HR: 57 (13 Nov 2018 07:12) (57 - 78)  BP: 112/66 (13 Nov 2018 07:12) (104/61 - 169/51)  BP(mean): --  RR: 18 (13 Nov 2018 07:12) (15 - 20)  SpO2: 94% (13 Nov 2018 07:12) (93% - 96%)    I&O's Summary    12 Nov 2018 07:01  -  13 Nov 2018 07:00  --------------------------------------------------------  IN: 0 mL / OUT: 700 mL / NET: -700 mL        ON EXAM:    General: NAD, awake and alert, oriented x 3  HEENT: Mucous membranes are moist, anicteric  Lungs: Non-labored, breath sounds are clear bilaterally, No wheezing, rales or rhonchi.  Decreased bs b/l  Cardiovascular: Regular, S1 and S2, no murmurs, rubs, or gallops.  Distant  Gastrointestinal: Bowel Sounds present, soft, nontender.  Obese  Lymph: chronic b/l LE peripheral edema. No lymphadenopathy.  Skin: chronic b/l LE venous stasis changes  Psych:  Mood & affect appropriate    LABS: All Labs Reviewed:                        12.1 12.62 )-----------( 143      ( 12 Nov 2018 16:27 )             36.0     12 Nov 2018 16:27    135    |  100    |  48     ----------------------------<  99     4.0     |  27     |  1.90     Ca    7.9        12 Nov 2018 16:27    TPro  7.5    /  Alb  3.2    /  TBili  0.8    /  DBili  x      /  AST  213    /  ALT  84     /  AlkPhos  87     12 Nov 2018 16:27    PT/INR - ( 12 Nov 2018 16:27 )   PT: 24.0 sec;   INR: 2.08 ratio         PTT - ( 12 Nov 2018 16:27 )  PTT:38.2 sec  CARDIAC MARKERS ( 12 Nov 2018 16:27 )  .102 ng/mL / x     / 4655 U/L / x     / 8.0 ng/mL      Blood Culture: Organism --  Gram Stain Blood -- Gram Stain   Growth in aerobic and anaerobic bottles:  Gram Positive Cocci in Pairs and Chains  Specimen Source .Blood Blood-Peripheral  Culture-Blood --

## 2018-11-13 NOTE — PROGRESS NOTE ADULT - ASSESSMENT
83 M with a history of HTN, AF on AC, PPM, BPH, chronic venous stasis p/w rhabdo and abn EKG.     - Jm are more indicative of peripheral muscle breakdown rather than ACS  - Cont IVF  - No signs of ADHF. Lower ext edema chronic.   - Monitor closely for vol ol.  He seems to be tolerating the volume load at the present time    - Currently EKG with AF wtih Diffues TWI.   - Previous EKG is .   - Likely EKG changes may be from post pacing T wave changes (T wave memory)    - Hold off on diuretics.     - Hold BP meds for now.  To be resumed when able    - Please continue to maintain strict I/Os, monitor daily weights, Cr, and K.   - Cont Abx.   - Further cardiac workup will depend on clinical course.   - All other workup per primary team. Will followup.

## 2018-11-13 NOTE — CONSULT NOTE ADULT - SUBJECTIVE AND OBJECTIVE BOX
Patient is a 83y old  Male with a history of HTN, AF on AC, PPM, BPH, chronic venous stasis p/w back pain and weakness post fall 3 days ago, he spent 10-12 hours on the floor trying to get up. He was taking large quantities of NSAIDS. In E.R. noted to be in ARPITA, started on IVF and Higgins was placed. Sono with no hydronephrosis. No PVR charted. He has positive blood cultures, elevated CPK and troponin and now is dyspneic, orthopneic with expiratory wheezing.    PAST MEDICAL & SURGICAL HISTORY:  Pacemaker  Atrial fibrillation  BPH (benign prostatic hypertrophy)  Hypertension  repair of fracture of right wrist with hardware  Cardiac pacemaker    Allergies:  lactose (Diarrhea)  penicillin (Hives)      Home Medications:   amlodipine-benazepril 10 mg-20 mg oral capsule: 1 cap(s) orally once a day  terazosin 5 mg oral tablet: orally once a day  warfarin 4 mg oral tablet: 1 tab(s) orally once a day    MEDICATIONS  (STANDING):  ALBUTerol    90 MICROgram(s) HFA Inhaler 1 Puff(s) Inhalation every 4 hours  ALBUTerol/ipratropium for Nebulization 3 milliLiter(s) Nebulizer every 6 hours  doxazosin 4 milliGRAM(s) Oral at bedtime  enoxaparin Injectable 40 milliGRAM(s) SubCutaneous daily  furosemide   Injectable 40 milliGRAM(s) IV Push once  lidocaine   Patch 1 Patch Transdermal daily  pantoprazole  Injectable 40 milliGRAM(s) IV Push daily  tiotropium 18 MICROgram(s) Capsule 1 Capsule(s) Inhalation daily    MEDICATIONS  (PRN):  acetaminophen   Tablet .. 650 milliGRAM(s) Oral every 6 hours PRN Temp greater or equal to 38C (100.4F), Mild Pain (1 - 3)    I&O's Detail    2018 07:  -  2018 07:00  --------------------------------------------------------  IN:  Total IN: 0 mL    OUT:    Indwelling Catheter - Urethral: 700 mL  Total OUT: 700 mL    Total NET: -700 mL      2018 07:  -  2018 15:23  --------------------------------------------------------  IN:    Oral Fluid: 200 mL    sodium chloride 0.9%: 600 mL  Total IN: 800 mL    OUT:  Total OUT: 0 mL    Total NET: 800 mL        Vital Signs Last 24 Hrs  T(C): 37.8 (2018 12:05), Max: 37.8 (2018 12:05)  T(F): 100.1 (2018 12:05), Max: 100.1 (2018 12:05)  HR: 62 (2018 14:53) (57 - 67)  BP: 127/65 (2018 12:05) (104/61 - 147/55)  BP(mean): --  RR: 18 (2018 12:05) (16 - 20)  SpO2: 94% (2018 14:53) (92% - 96%)    PHYSICAL EXAM:  General: dyspneic, orthopneic, uncomfortable, conversant  Has JVD  Respiratory: b/l air entry, scattered wet rales, expiratory wheezes  Cardiovascular: S1 S2 ireg irreg  Gastrointestinal: soft, NT, BS present  Extremities: thighs edema present, stasis changes lower legs          138  |  106  |  42<H>  ----------------------------<  94  3.8   |  23  |  1.30    Ca    7.1<L>      2018 07:16    TPro  7.5  /  Alb  3.2<L>  /  TBili  0.8  /  DBili  x   /  AST  213<H>  /  ALT  84<H>  /  AlkPhos  87      Hemoglobin: 11.4 g/dL ( @ 07:16)  Hematocrit: 35.1 % ( @ 07:16)  Potassium, Serum: 3.8 mmol/L ( @ 07:16)  Blood Urea Nitrogen, Serum: 42 mg/dL ( @ 07:16)  Calcium, Total Serum: 7.1 mg/dL ( @ 07:16)  Hematocrit: 36.0 % ( @ 16:27)  Hemoglobin: 12.1 g/dL ( @ 16:27)  Potassium, Serum: 4.0 mmol/L ( @ 16:27)  Blood Urea Nitrogen, Serum: 48 mg/dL ( @ 16:27)  Calcium, Total Serum: 7.9 mg/dL ( @ 16:27)      Creatinine, Serum: 1.30 ( 07:16)  Creatinine, Serum: 1.90 ( @ 16:27)    CBC Full  -  ( 2018 07:16 )  WBC Count : 11.71 K/uL  Hemoglobin : 11.4 g/dL  Hematocrit : 35.1 %  Platelet Count - Automated : 130 K/uL  Mean Cell Volume : 90.7 fl  Mean Cell Hemoglobin : 29.5 pg  Mean Cell Hemoglobin Concentration : 32.5 gm/dL  Auto Neutrophil # : 9.84 K/uL  Auto Lymphocyte # : 0.82 K/uL  Auto Monocyte # : 1.05 K/uL  Auto Eosinophil # : 0.00 K/uL  Auto Basophil # : 0.00 K/uL  Auto Neutrophil % : 69.0 %  Auto Lymphocyte % : 7.0 %  Auto Monocyte % : 9.0 %  Auto Eosinophil % : 0.0 %  Auto Basophil % : 0.0 %    Urinalysis Basic - ( 2018 18:26 )    Color: Isaura / Appearance: Slightly Turbid / S.025 / pH: x  Gluc: x / Ketone: Small  / Bili: Small / Urobili: 1   Blood: x / Protein: 25 mg/dL / Nitrite: Negative   Leuk Esterase: Trace / RBC: 0-2 /HPF / WBC 0-2   Sq Epi: x / Non Sq Epi: Occasional / Bacteria: Moderate

## 2018-11-14 DIAGNOSIS — M54.9 DORSALGIA, UNSPECIFIED: ICD-10-CM

## 2018-11-14 LAB
ALBUMIN SERPL ELPH-MCNC: 2.5 G/DL — LOW (ref 3.3–5)
ANION GAP SERPL CALC-SCNC: 7 MMOL/L — SIGNIFICANT CHANGE UP (ref 5–17)
BUN SERPL-MCNC: 30 MG/DL — HIGH (ref 7–23)
CALCIUM SERPL-MCNC: 7.5 MG/DL — LOW (ref 8.5–10.1)
CHLORIDE SERPL-SCNC: 106 MMOL/L — SIGNIFICANT CHANGE UP (ref 96–108)
CK SERPL-CCNC: 1076 U/L — HIGH (ref 26–308)
CO2 SERPL-SCNC: 29 MMOL/L — SIGNIFICANT CHANGE UP (ref 22–31)
CREAT SERPL-MCNC: 1.1 MG/DL — SIGNIFICANT CHANGE UP (ref 0.5–1.3)
CULTURE RESULTS: NO GROWTH — SIGNIFICANT CHANGE UP
GLUCOSE SERPL-MCNC: 117 MG/DL — HIGH (ref 70–99)
HCT VFR BLD CALC: 33.1 % — LOW (ref 39–50)
HGB BLD-MCNC: 11 G/DL — LOW (ref 13–17)
MAGNESIUM SERPL-MCNC: 2.4 MG/DL — SIGNIFICANT CHANGE UP (ref 1.6–2.6)
MCHC RBC-ENTMCNC: 29.9 PG — SIGNIFICANT CHANGE UP (ref 27–34)
MCHC RBC-ENTMCNC: 33.2 GM/DL — SIGNIFICANT CHANGE UP (ref 32–36)
MCV RBC AUTO: 89.9 FL — SIGNIFICANT CHANGE UP (ref 80–100)
NRBC # BLD: 0 /100 WBCS — SIGNIFICANT CHANGE UP (ref 0–0)
PHOSPHATE SERPL-MCNC: 2.1 MG/DL — LOW (ref 2.5–4.5)
PLATELET # BLD AUTO: 150 K/UL — SIGNIFICANT CHANGE UP (ref 150–400)
POTASSIUM SERPL-MCNC: 3.7 MMOL/L — SIGNIFICANT CHANGE UP (ref 3.5–5.3)
POTASSIUM SERPL-SCNC: 3.7 MMOL/L — SIGNIFICANT CHANGE UP (ref 3.5–5.3)
RBC # BLD: 3.68 M/UL — LOW (ref 4.2–5.8)
RBC # FLD: 14.3 % — SIGNIFICANT CHANGE UP (ref 10.3–14.5)
SODIUM SERPL-SCNC: 142 MMOL/L — SIGNIFICANT CHANGE UP (ref 135–145)
SPECIMEN SOURCE: SIGNIFICANT CHANGE UP
TROPONIN I SERPL-MCNC: 0.06 NG/ML — HIGH (ref 0.01–0.04)
WBC # BLD: 11.08 K/UL — HIGH (ref 3.8–10.5)
WBC # FLD AUTO: 11.08 K/UL — HIGH (ref 3.8–10.5)

## 2018-11-14 PROCEDURE — 99232 SBSQ HOSP IP/OBS MODERATE 35: CPT

## 2018-11-14 RX ADMIN — PANTOPRAZOLE SODIUM 40 MILLIGRAM(S): 20 TABLET, DELAYED RELEASE ORAL at 13:30

## 2018-11-14 RX ADMIN — Medication 650 MILLIGRAM(S): at 21:45

## 2018-11-14 RX ADMIN — LIDOCAINE 1 PATCH: 4 CREAM TOPICAL at 09:38

## 2018-11-14 RX ADMIN — Medication 3 MILLILITER(S): at 14:52

## 2018-11-14 RX ADMIN — Medication 3 MILLILITER(S): at 07:33

## 2018-11-14 RX ADMIN — Medication 3 MILLILITER(S): at 21:14

## 2018-11-14 RX ADMIN — ENOXAPARIN SODIUM 40 MILLIGRAM(S): 100 INJECTION SUBCUTANEOUS at 13:30

## 2018-11-14 RX ADMIN — Medication 4 MILLIGRAM(S): at 21:42

## 2018-11-14 RX ADMIN — Medication 3 MILLILITER(S): at 01:17

## 2018-11-14 RX ADMIN — LIDOCAINE 1 PATCH: 4 CREAM TOPICAL at 22:00

## 2018-11-14 RX ADMIN — Medication 650 MILLIGRAM(S): at 22:00

## 2018-11-14 RX ADMIN — CEFTRIAXONE 100 GRAM(S): 500 INJECTION, POWDER, FOR SOLUTION INTRAMUSCULAR; INTRAVENOUS at 21:42

## 2018-11-14 NOTE — PROGRESS NOTE ADULT - ASSESSMENT
83 M with a history of HTN, AF on AC, PPM, BPH, chronic venous stasis p/w rhabdo and abn EKG.     - Jm are more indicative of peripheral muscle breakdown rather than ACS  - has been febrile overnight. CXR with PNA  - No signs of ADHF. Lower ext edema chronic.   - Monitor closely for vol ol. He may need gentle diuresis later today.    - Currently EKG with AF with Diffuse TWI.   - Previous EKG is .   - Likely EKG changes may be from post pacing T wave changes (T wave memory)    - Hold off on diuretics.     - Hold BP meds for now.  To be resumed when able, and if BP is sustained in the 140's    - Please continue to maintain strict I/Os, monitor daily weights, Cr, and K.   - Cont Abx for PNA  - Further cardiac workup will depend on clinical course.   - All other workup per primary team. Will followup.

## 2018-11-14 NOTE — PROGRESS NOTE ADULT - SUBJECTIVE AND OBJECTIVE BOX
Patient is a 83y old  Male who presents with a chief complaint of fall , weakness , back pain (2018 09:38)      Patient seen in follow up for ARPITA. Improved renal function.     PAST MEDICAL HISTORY:  Pacemaker  Atrial fibrillation  BPH (benign prostatic hypertrophy)  Hypertension    MEDICATIONS  (STANDING):  ALBUTerol    90 MICROgram(s) HFA Inhaler 1 Puff(s) Inhalation every 4 hours  ALBUTerol/ipratropium for Nebulization 3 milliLiter(s) Nebulizer every 6 hours  cefTRIAXone   IVPB 1 Gram(s) IV Intermittent every 24 hours  doxazosin 4 milliGRAM(s) Oral at bedtime  enoxaparin Injectable 40 milliGRAM(s) SubCutaneous daily  lidocaine   Patch 1 Patch Transdermal daily  pantoprazole  Injectable 40 milliGRAM(s) IV Push daily  tiotropium 18 MICROgram(s) Capsule 1 Capsule(s) Inhalation daily    MEDICATIONS  (PRN):  acetaminophen   Tablet .. 650 milliGRAM(s) Oral every 6 hours PRN Temp greater or equal to 38C (100.4F), Mild Pain (1 - 3)    T(C): 36.6 (18 @ 12:17), Max: 38.4 (18 @ 20:21)  HR: 59 (18 @ 12:17) (57 - 67)  BP: 164/68 (18 @ 12:17) (111/78 - 164/68)  RR: 20 (18 @ 12:17) (16 - 20)  SpO2: 90% (18 @ 12:17) (90% - 95%)  Wt(kg): --  I&O's Detail    2018 07:01  -  2018 07:00  --------------------------------------------------------  IN:    Oral Fluid: 360 mL    sodium chloride 0.9%: 600 mL  Total IN: 960 mL    OUT:    Indwelling Catheter - Urethral: 2800 mL  Total OUT: 2800 mL    Total NET: -1840 mL          PHYSICAL EXAM:  General: NAD  Respiratory: b/l air entry  Cardiovascular: S1 S2  Gastrointestinal: soft  Extremities:  edema                          11.0   11.08 )-----------( 150      ( 2018 08:41 )             33.1         142  |  106  |  30<H>  ----------------------------<  117<H>  3.7   |  29  |  1.10    Ca    7.5<L>      2018 08:41  Phos  2.1       Mg     2.4         TPro  x   /  Alb  2.5<L>  /  TBili  x   /  DBili  x   /  AST  x   /  ALT  x   /  AlkPhos  x       CARDIAC MARKERS ( 2018 08:41 )  .055 ng/mL / x     / 1076 U/L / x     / x      CARDIAC MARKERS ( 2018 07:16 )  x     / x     / 2785 U/L / x     / x      CARDIAC MARKERS ( 2018 16:27 )  .102 ng/mL / x     / 4655 U/L / x     / 8.0 ng/mL      LIVER FUNCTIONS - ( 2018 08:41 )  Alb: 2.5 g/dL / Pro: x     / ALK PHOS: x     / ALT: x     / AST: x     / GGT: x           Urinalysis Basic - ( 2018 18:26 )    Color: Isaura / Appearance: Slightly Turbid / S.025 / pH: x  Gluc: x / Ketone: Small  / Bili: Small / Urobili: 1   Blood: x / Protein: 25 mg/dL / Nitrite: Negative   Leuk Esterase: Trace / RBC: 0-2 /HPF / WBC 0-2   Sq Epi: x / Non Sq Epi: Occasional / Bacteria: Moderate        Sodium, Serum: 142 ( @ 08:41)  Sodium, Serum: 138 ( @ 07:16)  Sodium, Serum: 135 ( @ 16:27)    Creatinine, Serum: 1.10 ( 08:41)  Creatinine, Serum: 1.30 ( 07:16)  Creatinine, Serum: 1.90 ( @ 16:27)    Potassium, Serum: 3.7 ( 08:41)  Potassium, Serum: 3.8 ( 07:16)  Potassium, Serum: 4.0 ( @ 16:27)    Hemoglobin: 11.0 ( @ 08:41)  Hemoglobin: 11.4 ( @ 07:16)  Hemoglobin: 12.1 ( @ 16:27)

## 2018-11-14 NOTE — PROGRESS NOTE ADULT - ASSESSMENT
1.	ARPITA  2.	Pneumonia  3.	Hypertension    Improved renal function. To continue current meds. Monitor BP trend. Titrate BP meds as needed. Salt restriction.   On IV abx,. D/w family at bedside.

## 2018-11-14 NOTE — PROGRESS NOTE ADULT - SUBJECTIVE AND OBJECTIVE BOX
pt  seen on rounds improved  less sob  back pain  persists vss  lungs occc crackles   cor  irregular rate  abdomen  obese  extremities  no  edema lytes improved  cbc  stable to continue iv  antibiotics continue  vazquez x 24  hours and  clear  fluids

## 2018-11-14 NOTE — PROGRESS NOTE ADULT - SUBJECTIVE AND OBJECTIVE BOX
[facility] - Shar Thomas, Ananya, Latesha, Pilo, Js Griffinemeka  Office Number:  [phone]    Patient resting comfortably in bed in NAD.  Says he is mildly sob this morning. no chest pain or palpitations.    ROS: negative unless otherwise mentioned.    Telemetry:      MEDICATIONS  (STANDING):  ALBUTerol    90 MICROgram(s) HFA Inhaler 1 Puff(s) Inhalation every 4 hours  ALBUTerol/ipratropium for Nebulization 3 milliLiter(s) Nebulizer every 6 hours  cefTRIAXone   IVPB 1 Gram(s) IV Intermittent every 24 hours  doxazosin 4 milliGRAM(s) Oral at bedtime  enoxaparin Injectable 40 milliGRAM(s) SubCutaneous daily  lidocaine   Patch 1 Patch Transdermal daily  pantoprazole  Injectable 40 milliGRAM(s) IV Push daily  tiotropium 18 MICROgram(s) Capsule 1 Capsule(s) Inhalation daily    MEDICATIONS  (PRN):  acetaminophen   Tablet .. 650 milliGRAM(s) Oral every 6 hours PRN Temp greater or equal to 38C (100.4F), Mild Pain (1 - 3)      Allergies    lactose (Diarrhea)  penicillin (Hives)    Intolerances        Vital Signs Last 24 Hrs  T(C): 36.7 (14 Nov 2018 07:45), Max: 38.4 (13 Nov 2018 20:21)  T(F): 98 (14 Nov 2018 07:45), Max: 101.1 (13 Nov 2018 20:21)  HR: 62 (14 Nov 2018 07:45) (60 - 67)  BP: 126/65 (14 Nov 2018 07:45) (111/78 - 160/59)  BP(mean): --  RR: 18 (14 Nov 2018 07:45) (18 - 20)  SpO2: 92% (14 Nov 2018 07:45) (90% - 95%)    I&O's Summary    13 Nov 2018 07:01  -  14 Nov 2018 07:00  --------------------------------------------------------  IN: 960 mL / OUT: 2800 mL / NET: -1840 mL        ON EXAM:    General: NAD, awake and alert, oriented x 3  HEENT: Mucous membranes are moist, anicteric  Lungs: Non-labored, breath sounds are clear bilaterally, No wheezing, rales or rhonchi.  Decreased bs b/l  Cardiovascular: Regular, S1 and S2, no murmurs, rubs, or gallops.  Distant  Gastrointestinal: Bowel Sounds present, soft, nontender.  Obese  Lymph: chronic b/l LE peripheral edema. No lymphadenopathy.  Skin: chronic b/l LE venous stasis changes  Psych:  Mood & affect appropriate    LABS: All Labs Reviewed:                        11.0   11.08 )-----------( 150      ( 14 Nov 2018 08:41 )             33.1                         11.4   11.71 )-----------( 130      ( 13 Nov 2018 07:16 )             35.1                         12.1   12.62 )-----------( 143      ( 12 Nov 2018 16:27 )             36.0     13 Nov 2018 07:16    138    |  106    |  42     ----------------------------<  94     3.8     |  23     |  1.30   12 Nov 2018 16:27    135    |  100    |  48     ----------------------------<  99     4.0     |  27     |  1.90     Ca    7.1        13 Nov 2018 07:16  Ca    7.9        12 Nov 2018 16:27    TPro  7.5    /  Alb  3.2    /  TBili  0.8    /  DBili  x      /  AST  213    /  ALT  84     /  AlkPhos  87     12 Nov 2018 16:27    PT/INR - ( 13 Nov 2018 07:16 )   PT: 30.7 sec;   INR: 2.64 ratio         PTT - ( 12 Nov 2018 16:27 )  PTT:38.2 sec  CARDIAC MARKERS ( 13 Nov 2018 07:16 )  x     / x     / 2785 U/L / x     / x      CARDIAC MARKERS ( 12 Nov 2018 16:27 )  .102 ng/mL / x     / 4655 U/L / x     / 8.0 ng/mL      Blood Culture: Organism --  Gram Stain Blood -- Gram Stain --  Specimen Source .Urine Clean Catch (Midstream)  Culture-Blood --    Organism Blood Culture PCR  Gram Stain Blood -- Gram Stain   Growth in aerobic and anaerobic bottles:  Gram Positive Cocci in Pairs and Chains  Specimen Source .Blood Blood-Peripheral  Culture-Blood --

## 2018-11-15 LAB
-  CEFTRIAXONE: SIGNIFICANT CHANGE UP
-  CLINDAMYCIN: SIGNIFICANT CHANGE UP
-  LEVOFLOXACIN: SIGNIFICANT CHANGE UP
-  PENICILLIN: SIGNIFICANT CHANGE UP
-  VANCOMYCIN: SIGNIFICANT CHANGE UP
ANION GAP SERPL CALC-SCNC: 7 MMOL/L — SIGNIFICANT CHANGE UP (ref 5–17)
BUN SERPL-MCNC: 21 MG/DL — SIGNIFICANT CHANGE UP (ref 7–23)
CALCIUM SERPL-MCNC: 7.6 MG/DL — LOW (ref 8.5–10.1)
CHLORIDE SERPL-SCNC: 107 MMOL/L — SIGNIFICANT CHANGE UP (ref 96–108)
CO2 SERPL-SCNC: 27 MMOL/L — SIGNIFICANT CHANGE UP (ref 22–31)
CREAT SERPL-MCNC: 0.89 MG/DL — SIGNIFICANT CHANGE UP (ref 0.5–1.3)
CULTURE RESULTS: SIGNIFICANT CHANGE UP
CULTURE RESULTS: SIGNIFICANT CHANGE UP
GLUCOSE SERPL-MCNC: 105 MG/DL — HIGH (ref 70–99)
HCT VFR BLD CALC: 31.3 % — LOW (ref 39–50)
HGB BLD-MCNC: 10.4 G/DL — LOW (ref 13–17)
MCHC RBC-ENTMCNC: 29.5 PG — SIGNIFICANT CHANGE UP (ref 27–34)
MCHC RBC-ENTMCNC: 33.2 GM/DL — SIGNIFICANT CHANGE UP (ref 32–36)
MCV RBC AUTO: 88.7 FL — SIGNIFICANT CHANGE UP (ref 80–100)
METHOD TYPE: SIGNIFICANT CHANGE UP
METHOD TYPE: SIGNIFICANT CHANGE UP
NRBC # BLD: 0 /100 WBCS — SIGNIFICANT CHANGE UP (ref 0–0)
ORGANISM # SPEC MICROSCOPIC CNT: SIGNIFICANT CHANGE UP
PLATELET # BLD AUTO: 150 K/UL — SIGNIFICANT CHANGE UP (ref 150–400)
POTASSIUM SERPL-MCNC: 3.5 MMOL/L — SIGNIFICANT CHANGE UP (ref 3.5–5.3)
POTASSIUM SERPL-SCNC: 3.5 MMOL/L — SIGNIFICANT CHANGE UP (ref 3.5–5.3)
RBC # BLD: 3.53 M/UL — LOW (ref 4.2–5.8)
RBC # FLD: 14.5 % — SIGNIFICANT CHANGE UP (ref 10.3–14.5)
SODIUM SERPL-SCNC: 141 MMOL/L — SIGNIFICANT CHANGE UP (ref 135–145)
SPECIMEN SOURCE: SIGNIFICANT CHANGE UP
SPECIMEN SOURCE: SIGNIFICANT CHANGE UP
WBC # BLD: 7.09 K/UL — SIGNIFICANT CHANGE UP (ref 3.8–10.5)
WBC # FLD AUTO: 7.09 K/UL — SIGNIFICANT CHANGE UP (ref 3.8–10.5)

## 2018-11-15 PROCEDURE — 99232 SBSQ HOSP IP/OBS MODERATE 35: CPT

## 2018-11-15 PROCEDURE — 71045 X-RAY EXAM CHEST 1 VIEW: CPT | Mod: 26

## 2018-11-15 RX ADMIN — Medication 650 MILLIGRAM(S): at 21:45

## 2018-11-15 RX ADMIN — LIDOCAINE 1 PATCH: 4 CREAM TOPICAL at 20:45

## 2018-11-15 RX ADMIN — Medication 3 MILLILITER(S): at 08:07

## 2018-11-15 RX ADMIN — Medication 3 MILLILITER(S): at 02:00

## 2018-11-15 RX ADMIN — Medication 3 MILLILITER(S): at 19:45

## 2018-11-15 RX ADMIN — Medication 3 MILLILITER(S): at 14:19

## 2018-11-15 RX ADMIN — LIDOCAINE 1 PATCH: 4 CREAM TOPICAL at 11:06

## 2018-11-15 RX ADMIN — CEFTRIAXONE 100 GRAM(S): 500 INJECTION, POWDER, FOR SOLUTION INTRAMUSCULAR; INTRAVENOUS at 21:44

## 2018-11-15 RX ADMIN — ENOXAPARIN SODIUM 40 MILLIGRAM(S): 100 INJECTION SUBCUTANEOUS at 11:06

## 2018-11-15 RX ADMIN — Medication 4 MILLIGRAM(S): at 21:44

## 2018-11-15 RX ADMIN — PANTOPRAZOLE SODIUM 40 MILLIGRAM(S): 20 TABLET, DELAYED RELEASE ORAL at 11:06

## 2018-11-15 NOTE — PROGRESS NOTE ADULT - SUBJECTIVE AND OBJECTIVE BOX
pt  seen on rounds  feel  better  still  weak has  appetitie vss  lungs occ crackles   cor  irregular rate abdomen  soft  extremities  no  edema bun  improved wbc  nl to  get  cxr

## 2018-11-15 NOTE — DIETITIAN INITIAL EVALUATION ADULT. - NS AS NUTRI INTERV MEALS SNACK
Recommend DASH/TLC diet, discussed with MD and diet advanced. Pt encouraged to consume small frequent meals as tolerated./Other (specify)

## 2018-11-15 NOTE — DIETITIAN INITIAL EVALUATION ADULT. - FACTORS AFF FOOD INTAKE
acute illness, pt requesting clear liquids since admission and is now feeling well enough to eat./other (specify)

## 2018-11-15 NOTE — PROGRESS NOTE ADULT - ASSESSMENT
1.	ARPITA  2.	Pneumonia  3.	Hypertension    Improved and stable renal function. To continue current meds. Monitor BP trend.   Titrate BP meds as needed. Salt restriction. On IV abx,. Will follow electrolytes and renal function trend.

## 2018-11-15 NOTE — PROGRESS NOTE ADULT - SUBJECTIVE AND OBJECTIVE BOX
[facility] -- Shar Thomas, Ananya, Latesha, Js Daigle Savella  Office # [phone]      Follow Up:      Subjective/Observations:       REVIEW OF SYSTEMS: All other review of systems is negative unless indicated above    PAST MEDICAL & SURGICAL HISTORY:  Pacemaker  Atrial fibrillation  BPH (benign prostatic hypertrophy)  Hypertension  repair of fracture of right wrist with hardware  Cardiac pacemaker      MEDICATIONS  (STANDING):  ALBUTerol    90 MICROgram(s) HFA Inhaler 1 Puff(s) Inhalation every 4 hours  ALBUTerol/ipratropium for Nebulization 3 milliLiter(s) Nebulizer every 6 hours  cefTRIAXone   IVPB 1 Gram(s) IV Intermittent every 24 hours  doxazosin 4 milliGRAM(s) Oral at bedtime  enoxaparin Injectable 40 milliGRAM(s) SubCutaneous daily  lidocaine   Patch 1 Patch Transdermal daily  pantoprazole  Injectable 40 milliGRAM(s) IV Push daily  tiotropium 18 MICROgram(s) Capsule 1 Capsule(s) Inhalation daily    MEDICATIONS  (PRN):  acetaminophen   Tablet .. 650 milliGRAM(s) Oral every 6 hours PRN Temp greater or equal to 38C (100.4F), Mild Pain (1 - 3)      Allergies    lactose (Diarrhea)  penicillin (Hives)    Intolerances        Vital Signs Last 24 Hrs  T(C): 36.4 (15 Nov 2018 07:39), Max: 37.3 (14 Nov 2018 20:49)  T(F): 97.5 (15 Nov 2018 07:39), Max: 99.2 (14 Nov 2018 20:49)  HR: 60 (15 Nov 2018 08:07) (58 - 69)  BP: 133/69 (15 Nov 2018 07:39) (120/67 - 164/68)  BP(mean): --  RR: 20 (15 Nov 2018 11:16) (18 - 23)  SpO2: 89% (15 Nov 2018 11:16) (89% - 98%)    I&O's Summary    14 Nov 2018 07:01  -  15 Nov 2018 07:00  --------------------------------------------------------  IN: 0 mL / OUT: 900 mL / NET: -900 mL          PHYSICAL EXAM:  TELE:   Constitutional: NAD, awake and alert, well-developed  HEENT: Moist Mucous Membranes, Anicteric  Pulmonary: Non-labored, breath sounds are clear bilaterally, No wheezing, crackles or rhonchi  Cardiovascular: Regular, S1 and S2 nl, No murmurs, rubs, gallops or clicks  Gastrointestinal: Bowel Sounds present, soft, nontender.   Lymph: No lymphadenopathy. No peripheral edema.  Skin: No visible rashes or ulcers.  Psych:  Mood & affect appropriate    LABS: All Labs Reviewed:                        10.4   7.09  )-----------( 150      ( 15 Nov 2018 07:23 )             31.3                         11.0   11.08 )-----------( 150      ( 14 Nov 2018 08:41 )             33.1                         11.4   11.71 )-----------( 130      ( 13 Nov 2018 07:16 )             35.1     15 Nov 2018 07:23    141    |  107    |  21     ----------------------------<  105    3.5     |  27     |  0.89   14 Nov 2018 08:41    142    |  106    |  30     ----------------------------<  117    3.7     |  29     |  1.10   13 Nov 2018 07:16    138    |  106    |  42     ----------------------------<  94     3.8     |  23     |  1.30     Ca    7.6        15 Nov 2018 07:23  Ca    7.5        14 Nov 2018 08:41  Ca    7.1        13 Nov 2018 07:16  Phos  2.1       14 Nov 2018 08:41  Mg     2.4       14 Nov 2018 08:41    TPro  x      /  Alb  2.5    /  TBili  x      /  DBili  x      /  AST  x      /  ALT  x      /  AlkPhos  x      14 Nov 2018 08:41  TPro  7.5    /  Alb  3.2    /  TBili  0.8    /  DBili  x      /  AST  213    /  ALT  84     /  AlkPhos  87     12 Nov 2018 16:27      CARDIAC MARKERS ( 14 Nov 2018 08:41 )  .055 ng/mL / x     / 1076 U/L / x     / x             ECG:  < from: 12 Lead ECG (11.12.18 @ 15:28) >  Ventricular Rate 67 BPM    Atrial Rate 70 BPM    QRS Duration 86 ms    Q-T Interval 432 ms    QTC Calculation(Bezet) 456 ms    R Axis 47 degrees    T Axis -74 degrees    Diagnosis Line Accelerated Junctional rhythm  ST & T wave abnormality, consider inferior ischemia  ST & T wave abnormality, consider anterolateral ischemia  Abnormal ECG    Confirmed by Christian Howell ([id number]) on 11/13/2018 10:34:32 AM    < end of copied text >    Echo:  < from: Transthoracic Echocardiogram (09.06.18 @ 10:13) >  Patient name: LACIE SANCHEZ  YOB: 1935   Age: 83 (M)   MR#: [id number]  Study Date: 9/6/2018  Location: O/PSonographer: Annamarie Franks SHERIF  Study quality: Technically fair  Referring Physician: JELENA TALBOT MD  Blood Pressure: 151/58 mmHg  Height: 173 cm  Weight: 103 kg  BSA: 2.2 m2  Heart Rate: 80 mmHg  ------------------------------------------------------------------------  PROCEDURE: Transthoracic echocardiogram with 2-D, M-Mode  and complete spectral and color flow Doppler.  INDICATION: Unspecified atrial fibrillation (I48.91)  ------------------------------------------------------------------------  Dimensions:    Normal Values:  LA:     5.0    2.0 - 4.0 cm  Ao:     3.4    2.0 - 3.8 cm  SEPTUM: 1.0    0.6 - 1.2 cm  PWT:    0.9    0.6 - 1.1 cm  LVIDd:  5.2    3.0 - 5.6 cm  LVIDs:  2.6    1.8 - 4.0 cm  Derived variables:  LVMI: 84 g/m2  RWT: 0.34  Fractional short: 50 %  EF (Visual Estimate): 60-65 %  EF (Modified Godinez Rule): 61 %Doppler Peak Velocity  (m/sec): AoV=1.6  ------------------------------------------------------------------------  Observations:  Mitral Valve: Mitral annular calcification, otherwise  normal mitral valve. Minimal mitral regurgitation.  Aortic Valve/Aorta: Aortic valve not well visualized;  appears calcified. Peak transaortic valve gradient equals  10 mm Hg. Minimal aortic regurgitation.  Peak left  ventricular outflow tract gradient equals 5 mm Hg.  Aortic Root: 3.4 cm.  Left Atrium: Severely dilated left atrium.  LA volume index  = 50 cc/m2.  Left Ventricle: Endocardium not well visualized; grossly  normal left ventricular systolic function. Normal left  ventricular internal dimensions and wall thicknesses.  Normal diastolic function  Right Heart: Right atrium not well visualized. The right  ventricle is not well visualized; grossly normal right  ventricular systolic function. A device wire is noted in  the right heart. Normal tricuspid valve. Mild tricuspid  regurgitation. Pulmonic valve not well visualized. Minimal  pulmonic regurgitation.  Pericardium/Pleura: Normal pericardium with trace  pericardial effusion.  Hemodynamic: Estimated right atrial pressure is 8 mm Hg.  Estimated right ventricular systolic pressure equals 49 mm  Hg, assuming right atrial pressure equals 8 mm Hg,  consistent with mild pulmonary hypertension.  ------------------------------------------------------------------------  Conclusions:  1. Mitral annular calcification, otherwise normal mitral  valve. Minimal mitral regurgitation.  2. Severely dilated left atrium.  LA volume index = 50  cc/m2.  3. Normal left ventricular internal dimensions and wall  thicknesses.  4. Endocardium not well visualized; grossly normal left  ventricular systolic function.  5. The right ventricle is not well visualized; grossly  normal right ventricular systolic function. A device wire  is noted in the right heart.    < end of copied text >    Radiology:  < from: Xray Chest 1 View- PORTABLE-Urgent (11.15.18 @ 09:31) >  EXAM:  XR CHEST PORTABLE URGENT 1V                            PROCEDURE DATE:  11/15/2018          INTERPRETATION:  History: Pneumonia follow-up    Technique:  AP portable    Comparisons:  Chest x-ray dated 11/13/2018    Findings:     Airspace disease is present in the lower lobes particularly   in the retrocardiac space. Small left pleural effusion.  . Pacemaker   wires in right atrium and ventricle. Cardiomegaly.    The thorax is normal for age.    Impression: Cardiomegaly. Pacemaker. Bibasilar pneumonia, increased   density at lung bases. Small left pleural effusion                CAMPOS LIM M.D., ATTENDING RADIOLOGIST  This document has been electronically signed. Nov 15 2018 10:11AM        < end of copied text >           Rey Griffith ANP   Cardiology [facility] -- Shar Thomas, Ananya, Latesha, Js Daigle Savella  Office # [phone]      Follow Up:  Rhabdomyolysis abnormal ekg    Subjective/Observations: No events overnight resting comfortably in bed without complaints       REVIEW OF SYSTEMS: All other review of systems is negative unless indicated above    PAST MEDICAL & SURGICAL HISTORY:  Pacemaker  Atrial fibrillation  BPH (benign prostatic hypertrophy)  Hypertension  repair of fracture of right wrist with hardware  Cardiac pacemaker      MEDICATIONS  (STANDING):  ALBUTerol    90 MICROgram(s) HFA Inhaler 1 Puff(s) Inhalation every 4 hours  ALBUTerol/ipratropium for Nebulization 3 milliLiter(s) Nebulizer every 6 hours  cefTRIAXone   IVPB 1 Gram(s) IV Intermittent every 24 hours  doxazosin 4 milliGRAM(s) Oral at bedtime  enoxaparin Injectable 40 milliGRAM(s) SubCutaneous daily  lidocaine   Patch 1 Patch Transdermal daily  pantoprazole  Injectable 40 milliGRAM(s) IV Push daily  tiotropium 18 MICROgram(s) Capsule 1 Capsule(s) Inhalation daily    MEDICATIONS  (PRN):  acetaminophen   Tablet .. 650 milliGRAM(s) Oral every 6 hours PRN Temp greater or equal to 38C (100.4F), Mild Pain (1 - 3)      Allergies    lactose (Diarrhea)  penicillin (Hives)    Intolerances        Vital Signs Last 24 Hrs  T(C): 36.4 (15 Nov 2018 07:39), Max: 37.3 (14 Nov 2018 20:49)  T(F): 97.5 (15 Nov 2018 07:39), Max: 99.2 (14 Nov 2018 20:49)  HR: 60 (15 Nov 2018 08:07) (58 - 69)  BP: 133/69 (15 Nov 2018 07:39) (120/67 - 164/68)  BP(mean): --  RR: 20 (15 Nov 2018 11:16) (18 - 23)  SpO2: 89% (15 Nov 2018 11:16) (89% - 98%)    I&O's Summary    14 Nov 2018 07:01  -  15 Nov 2018 07:00  --------------------------------------------------------  IN: 0 mL / OUT: 900 mL / NET: -900 mL          PHYSICAL EXAM:  TELE: vpaced @ 60 no events  Constitutional: NAD, awake and alert, well-developed  HEENT: Moist Mucous Membranes, Anicteric  Pulmonary: Non-labored, breath sounds w/ crackles bilaterally 1/2 up lung field, No wheezing, or rhonchi  Cardiovascular: Regular, S1 and S2 nl, No murmurs, rubs, gallops or clicks  Gastrointestinal: Bowel Sounds present, soft, distended nontender.   Lymph: No lymphadenopathy. No peripheral edema.  Skin: No visible rashes or ulcers.  Psych:  Mood & affect appropriate    LABS: All Labs Reviewed:                        10.4   7.09  )-----------( 150      ( 15 Nov 2018 07:23 )             31.3                         11.0   11.08 )-----------( 150      ( 14 Nov 2018 08:41 )             33.1                         11.4   11.71 )-----------( 130      ( 13 Nov 2018 07:16 )             35.1     15 Nov 2018 07:23    141    |  107    |  21     ----------------------------<  105    3.5     |  27     |  0.89   14 Nov 2018 08:41    142    |  106    |  30     ----------------------------<  117    3.7     |  29     |  1.10   13 Nov 2018 07:16    138    |  106    |  42     ----------------------------<  94     3.8     |  23     |  1.30     Ca    7.6        15 Nov 2018 07:23  Ca    7.5        14 Nov 2018 08:41  Ca    7.1        13 Nov 2018 07:16  Phos  2.1       14 Nov 2018 08:41  Mg     2.4       14 Nov 2018 08:41    TPro  x      /  Alb  2.5    /  TBili  x      /  DBili  x      /  AST  x      /  ALT  x      /  AlkPhos  x      14 Nov 2018 08:41  TPro  7.5    /  Alb  3.2    /  TBili  0.8    /  DBili  x      /  AST  213    /  ALT  84     /  AlkPhos  87     12 Nov 2018 16:27      CARDIAC MARKERS ( 14 Nov 2018 08:41 )  .055 ng/mL / x     / 1076 U/L / x     / x             ECG:  < from: 12 Lead ECG (11.12.18 @ 15:28) >  Ventricular Rate 67 BPM    Atrial Rate 70 BPM    QRS Duration 86 ms    Q-T Interval 432 ms    QTC Calculation(Bezet) 456 ms    R Axis 47 degrees    T Axis -74 degrees    Diagnosis Line Accelerated Junctional rhythm  ST & T wave abnormality, consider inferior ischemia  ST & T wave abnormality, consider anterolateral ischemia  Abnormal ECG    Confirmed by Christian Howell ([id number]) on 11/13/2018 10:34:32 AM    < end of copied text >    Echo:  < from: Transthoracic Echocardiogram (09.06.18 @ 10:13) >  Patient name: LACIE SANCHEZ  YOB: 1935   Age: 83 (M)   MR#: [id number]  Study Date: 9/6/2018  Location: O/PSonographer: Annamarie Franks RDCS  Study quality: Technically fair  Referring Physician: JELENA TALBOT MD  Blood Pressure: 151/58 mmHg  Height: 173 cm  Weight: 103 kg  BSA: 2.2 m2  Heart Rate: 80 mmHg  ------------------------------------------------------------------------  PROCEDURE: Transthoracic echocardiogram with 2-D, M-Mode  and complete spectral and color flow Doppler.  INDICATION: Unspecified atrial fibrillation (I48.91)  ------------------------------------------------------------------------  Dimensions:    Normal Values:  LA:     5.0    2.0 - 4.0 cm  Ao:     3.4    2.0 - 3.8 cm  SEPTUM: 1.0    0.6 - 1.2 cm  PWT:    0.9    0.6 - 1.1 cm  LVIDd:  5.2    3.0 - 5.6 cm  LVIDs:  2.6    1.8 - 4.0 cm  Derived variables:  LVMI: 84 g/m2  RWT: 0.34  Fractional short: 50 %  EF (Visual Estimate): 60-65 %  EF (Modified Godinez Rule): 61 %Doppler Peak Velocity  (m/sec): AoV=1.6  ------------------------------------------------------------------------  Observations:  Mitral Valve: Mitral annular calcification, otherwise  normal mitral valve. Minimal mitral regurgitation.  Aortic Valve/Aorta: Aortic valve not well visualized;  appears calcified. Peak transaortic valve gradient equals  10 mm Hg. Minimal aortic regurgitation.  Peak left  ventricular outflow tract gradient equals 5 mm Hg.  Aortic Root: 3.4 cm.  Left Atrium: Severely dilated left atrium.  LA volume index  = 50 cc/m2.  Left Ventricle: Endocardium not well visualized; grossly  normal left ventricular systolic function. Normal left  ventricular internal dimensions and wall thicknesses.  Normal diastolic function  Right Heart: Right atrium not well visualized. The right  ventricle is not well visualized; grossly normal right  ventricular systolic function. A device wire is noted in  the right heart. Normal tricuspid valve. Mild tricuspid  regurgitation. Pulmonic valve not well visualized. Minimal  pulmonic regurgitation.  Pericardium/Pleura: Normal pericardium with trace  pericardial effusion.  Hemodynamic: Estimated right atrial pressure is 8 mm Hg.  Estimated right ventricular systolic pressure equals 49 mm  Hg, assuming right atrial pressure equals 8 mm Hg,  consistent with mild pulmonary hypertension.  ------------------------------------------------------------------------  Conclusions:  1. Mitral annular calcification, otherwise normal mitral  valve. Minimal mitral regurgitation.  2. Severely dilated left atrium.  LA volume index = 50  cc/m2.  3. Normal left ventricular internal dimensions and wall  thicknesses.  4. Endocardium not well visualized; grossly normal left  ventricular systolic function.  5. The right ventricle is not well visualized; grossly  normal right ventricular systolic function. A device wire  is noted in the right heart.    < end of copied text >    Radiology:  < from: Xray Chest 1 View- PORTABLE-Urgent (11.15.18 @ 09:31) >  EXAM:  XR CHEST PORTABLE URGENT 1V                            PROCEDURE DATE:  11/15/2018          INTERPRETATION:  History: Pneumonia follow-up    Technique:  AP portable    Comparisons:  Chest x-ray dated 11/13/2018    Findings:     Airspace disease is present in the lower lobes particularly   in the retrocardiac space. Small left pleural effusion.  . Pacemaker   wires in right atrium and ventricle. Cardiomegaly.    The thorax is normal for age.    Impression: Cardiomegaly. Pacemaker. Bibasilar pneumonia, increased   density at lung bases. Small left pleural effusion                CAMPOS LIM M.D., ATTENDING RADIOLOGIST  This document has been electronically signed. Nov 15 2018 10:11AM        < end of copied text >           Rey Griffith [facility]   Cardiology

## 2018-11-15 NOTE — DIETITIAN INITIAL EVALUATION ADULT. - ORAL INTAKE PTA
Pt reports typically good appetite intake, aside from acute decreased po intake x5 days. Pt states he was not feeling well enough to eat. Diet recall - breakfast: carnation instant breakfast with lactaid, or eggos or cereal, lunch: sandwich, dinner: meat with some kind of vegetable.

## 2018-11-15 NOTE — DIETITIAN INITIAL EVALUATION ADULT. - OTHER INFO
Pt seen for NPO/clears x 4 days. Per chart, pt is 82 Y/O M with PMH of Afib (on coumadin and aware of food-drug interaction, declined written materials), PPM, BPH, and HTN admitted for weakness/fall and found to have abnormal EKG, ARPITA. Pt reports good tolerance of clear liquids, typically consuming >50% of tray (jello, juice, broth). Pt reports UBW of 225-230 pounds with no recent change in wt. Denied N/V/diarrhea/constipation, last BM PTA. Not on bowel regimen, declined prunes. No difficulties chewing/swallowing. NKFA.

## 2018-11-15 NOTE — PROGRESS NOTE ADULT - SUBJECTIVE AND OBJECTIVE BOX
Patient is a 83y old  Male who presents with a chief complaint of fall , weakness , back pain (14 Nov 2018 09:38)      Patient seen in follow up for ARPITA. Improved renal function.     PAST MEDICAL HISTORY:  Pacemaker  Atrial fibrillation  BPH (benign prostatic hypertrophy)  Hypertension    MEDICATIONS  (STANDING):  ALBUTerol    90 MICROgram(s) HFA Inhaler 1 Puff(s) Inhalation every 4 hours  ALBUTerol/ipratropium for Nebulization 3 milliLiter(s) Nebulizer every 6 hours  cefTRIAXone   IVPB 1 Gram(s) IV Intermittent every 24 hours  doxazosin 4 milliGRAM(s) Oral at bedtime  enoxaparin Injectable 40 milliGRAM(s) SubCutaneous daily  lidocaine   Patch 1 Patch Transdermal daily  pantoprazole  Injectable 40 milliGRAM(s) IV Push daily  tiotropium 18 MICROgram(s) Capsule 1 Capsule(s) Inhalation daily    MEDICATIONS  (PRN):  acetaminophen   Tablet .. 650 milliGRAM(s) Oral every 6 hours PRN Temp greater or equal to 38C (100.4F), Mild Pain (1 - 3)    T(C): 36.3 (11-15-18 @ 12:17), Max: 38.4 (11-13-18 @ 20:21)  HR: 63 (11-15-18 @ 14:19) (58 - 69)  BP: 149/64 (11-15-18 @ 12:17) (111/78 - 164/68)  RR: 21 (11-15-18 @ 12:17)  SpO2: 94% (11-15-18 @ 14:19)  Wt(kg): --  I&O's Detail    14 Nov 2018 07:01  -  15 Nov 2018 07:00  --------------------------------------------------------  IN:  Total IN: 0 mL    OUT:    Indwelling Catheter - Urethral: 900 mL  Total OUT: 900 mL    Total NET: -900 mL      15 Nov 2018 07:01  -  15 Nov 2018 16:40  --------------------------------------------------------  IN:    Oral Fluid: 120 mL  Total IN: 120 mL    OUT:    Indwelling Catheter - Urethral: 500 mL  Total OUT: 500 mL    Total NET: -380 mL      PHYSICAL EXAM:  General: NAD  Respiratory: b/l air entry  Cardiovascular: S1 S2  Gastrointestinal: soft  Extremities:  edema                  LABORATORY:                        10.4   7.09  )-----------( 150      ( 15 Nov 2018 07:23 )             31.3     11-15    141  |  107  |  21  ----------------------------<  105<H>  3.5   |  27  |  0.89    Ca    7.6<L>      15 Nov 2018 07:23  Phos  2.1     11-14  Mg     2.4     11-14    TPro  x   /  Alb  2.5<L>  /  TBili  x   /  DBili  x   /  AST  x   /  ALT  x   /  AlkPhos  x   11-14    Sodium, Serum: 141 mmol/L (11-15 @ 07:23)  Sodium, Serum: 142 mmol/L (11-14 @ 08:41)    Potassium, Serum: 3.5 mmol/L (11-15 @ 07:23)  Potassium, Serum: 3.7 mmol/L (11-14 @ 08:41)    Hemoglobin: 10.4 g/dL (11-15 @ 07:23)  Hemoglobin: 11.0 g/dL (11-14 @ 08:41)  Hemoglobin: 11.4 g/dL (11-13 @ 07:16)    Creatinine, Serum 0.89 (11-15 @ 07:23)  Creatinine, Serum 1.10 (11-14 @ 08:41)  Creatinine, Serum 1.30 (11-13 @ 07:16)    CARDIAC MARKERS ( 14 Nov 2018 08:41 )  .055 ng/mL / x     / 1076 U/L / x     / x          LIVER FUNCTIONS - ( 14 Nov 2018 08:41 )  Alb: 2.5 g/dL / Pro: x     / ALK PHOS: x     / ALT: x     / AST: x     / GGT: x

## 2018-11-15 NOTE — PROGRESS NOTE ADULT - ASSESSMENT
83 M with a history of HTN, AF on AC, PPM, BPH, chronic venous stasis p/w rhabdo and abn EKG.     - Jm are more indicative of peripheral muscle breakdown rather than ACS  - has been febrile overnight. CXR with PNA  - No signs of ADHF. Lower ext edema chronic.   - Monitor closely for vol ol.  Curently no need for diuresis    - Currently EKG with AF with Diffuse TWI.   - Previous EKG is .   - Likely EKG changes may be from post pacing T wave changes (T wave memory)      - Would resume home BP meds to maintain a SBP less than 140    - Please continue to maintain strict I/Os, monitor daily weights, Cr, and K.   - Cont Abx for PNA  - Further cardiac workup will depend on clinical course.   - All other workup per primary team. Will followup.   Rey Griffith [facility]  Cardiology 83 M with a history of HTN, AF on AC, PPM, BPH, chronic venous stasis p/w rhabdo and abn EKG.     - Jm are more indicative of peripheral muscle breakdown rather than ACS  - has been febrile overnight. CXR with PNA  - No signs of ADHF. Lower ext edema chronic.   - Monitor closely for vol ol.  Curently no need for diuresis    - Currently EKG with AF with Diffuse TWI.   - Previous EKG is .   - Likely EKG changes may be from post pacing T wave changes (T wave memory)    - Would resume home BP meds to maintain a SBP less than 140    - Please continue to maintain strict I/Os, monitor daily weights, Cr, and K.   - Cont Abx for PNA  - Further cardiac workup will depend on clinical course.   - All other workup per primary team. Will followup.   Rey Griffith [facility]  Cardiology 83 M with a history of HTN, AF on AC, PPM, BPH, chronic venous stasis p/w rhabdo and abn EKG.     - Jm are more indicative of peripheral muscle breakdown rather than ACS  - has been febrile overnight. CXR with PNA  - No signs of ADHF. Lower ext edema chronic.   - Monitor closely for vol ol.  Curently no need for diuresis  - Currently EKG with AF with Diffuse TWI.   - Previous EKG is .   - Likely EKG changes may be from post pacing T wave changes (T wave memory)  - Would resume home BP meds to maintain a SBP less than 140  - Please continue to maintain strict I/Os, monitor daily weights, Cr, and K.   - Cont Abx for PNA  - Further cardiac workup will depend on clinical course.   - All other workup per primary team. Will followup.   Rey Griffith [facility]  Cardiology

## 2018-11-15 NOTE — DIETITIAN INITIAL EVALUATION ADULT. - ADHERENCE
Pt states he does not follow any specialized diet. Takes Multivitamin. Avoids lactose and drinks lactaid milk.

## 2018-11-15 NOTE — DIETITIAN INITIAL EVALUATION ADULT. - NS AS NUTRI INTERV ED CONTENT
Pt provided with written and verbal heart healthy nutrition education. Topics discussed include: limiting sodium intake, increasing consumption of fruits/vegetables/whole grains, limiting intake of saturated fat from red meat and full-fat dairy, and incorporating physical activity as medically feasible. Pt verbalized understanding of nutrition education and had no questions at this time. Will follow up regarding teach back ability.

## 2018-11-15 NOTE — DIETITIAN INITIAL EVALUATION ADULT. - ENERGY NEEDS
Wt: 232 pounds, Ht: 69 inches, BMI: 34.3 kg/m2, IBW: 160 pounds +/-10%, %IBW: 145%  +2 R leg, scrotum edema,  +3 L leg  No pressure injuries noted.

## 2018-11-16 LAB
ANION GAP SERPL CALC-SCNC: 7 MMOL/L — SIGNIFICANT CHANGE UP (ref 5–17)
BUN SERPL-MCNC: 17 MG/DL — SIGNIFICANT CHANGE UP (ref 7–23)
CALCIUM SERPL-MCNC: 7.7 MG/DL — LOW (ref 8.5–10.1)
CHLORIDE SERPL-SCNC: 106 MMOL/L — SIGNIFICANT CHANGE UP (ref 96–108)
CO2 SERPL-SCNC: 30 MMOL/L — SIGNIFICANT CHANGE UP (ref 22–31)
CREAT SERPL-MCNC: 0.76 MG/DL — SIGNIFICANT CHANGE UP (ref 0.5–1.3)
GLUCOSE SERPL-MCNC: 98 MG/DL — SIGNIFICANT CHANGE UP (ref 70–99)
POTASSIUM SERPL-MCNC: 3.5 MMOL/L — SIGNIFICANT CHANGE UP (ref 3.5–5.3)
POTASSIUM SERPL-SCNC: 3.5 MMOL/L — SIGNIFICANT CHANGE UP (ref 3.5–5.3)
SODIUM SERPL-SCNC: 143 MMOL/L — SIGNIFICANT CHANGE UP (ref 135–145)

## 2018-11-16 PROCEDURE — 99233 SBSQ HOSP IP/OBS HIGH 50: CPT

## 2018-11-16 PROCEDURE — 93280 PM DEVICE PROGR EVAL DUAL: CPT | Mod: 26

## 2018-11-16 RX ADMIN — Medication 650 MILLIGRAM(S): at 21:33

## 2018-11-16 RX ADMIN — LIDOCAINE 1 PATCH: 4 CREAM TOPICAL at 11:57

## 2018-11-16 RX ADMIN — Medication 3 MILLILITER(S): at 07:51

## 2018-11-16 RX ADMIN — CEFTRIAXONE 100 GRAM(S): 500 INJECTION, POWDER, FOR SOLUTION INTRAMUSCULAR; INTRAVENOUS at 21:32

## 2018-11-16 RX ADMIN — Medication 4 MILLIGRAM(S): at 21:32

## 2018-11-16 RX ADMIN — LIDOCAINE 1 PATCH: 4 CREAM TOPICAL at 23:06

## 2018-11-16 RX ADMIN — Medication 3 MILLILITER(S): at 20:38

## 2018-11-16 RX ADMIN — LIDOCAINE 1 PATCH: 4 CREAM TOPICAL at 19:00

## 2018-11-16 RX ADMIN — Medication 650 MILLIGRAM(S): at 22:33

## 2018-11-16 RX ADMIN — PANTOPRAZOLE SODIUM 40 MILLIGRAM(S): 20 TABLET, DELAYED RELEASE ORAL at 11:58

## 2018-11-16 RX ADMIN — Medication 3 MILLILITER(S): at 02:05

## 2018-11-16 RX ADMIN — Medication 3 MILLILITER(S): at 13:18

## 2018-11-16 RX ADMIN — ENOXAPARIN SODIUM 40 MILLIGRAM(S): 100 INJECTION SUBCUTANEOUS at 11:59

## 2018-11-16 NOTE — PROGRESS NOTE ADULT - SUBJECTIVE AND OBJECTIVE BOX
[facility] -- Shar Thomas, Latesha Hare, Js Daigle Savella  Office # [phone]      Follow Up:  Rhabdomyolysis and abnormal ECG    Subjective/Observations: Seen and examined.  Sitting up in bed feeling better today with no new complaints other than needing to gain his strength back.  States unable to lie flat 2/2 his chronic back pain does not state his breathing gets worse with lying flatter.  NAD.        REVIEW OF SYSTEMS: All other review of systems is negative unless indicated above    PAST MEDICAL & SURGICAL HISTORY:  Pacemaker  Atrial fibrillation  BPH (benign prostatic hypertrophy)  Hypertension  repair of fracture of right wrist with hardware  Cardiac pacemaker      MEDICATIONS  (STANDING):  ALBUTerol    90 MICROgram(s) HFA Inhaler 1 Puff(s) Inhalation every 4 hours  ALBUTerol/ipratropium for Nebulization 3 milliLiter(s) Nebulizer every 6 hours  cefTRIAXone   IVPB 1 Gram(s) IV Intermittent every 24 hours  doxazosin 4 milliGRAM(s) Oral at bedtime  enoxaparin Injectable 40 milliGRAM(s) SubCutaneous daily  lidocaine   Patch 1 Patch Transdermal daily  pantoprazole  Injectable 40 milliGRAM(s) IV Push daily  tiotropium 18 MICROgram(s) Capsule 1 Capsule(s) Inhalation daily    MEDICATIONS  (PRN):  acetaminophen   Tablet .. 650 milliGRAM(s) Oral every 6 hours PRN Temp greater or equal to 38C (100.4F), Mild Pain (1 - 3)      Allergies    lactose (Diarrhea)  penicillin (Hives)    Intolerances            Vital Signs Last 24 Hrs  T(C): 37 (16 Nov 2018 07:26), Max: 37.1 (16 Nov 2018 04:42)  T(F): 98.6 (16 Nov 2018 07:26), Max: 98.8 (16 Nov 2018 04:42)  HR: 59 (16 Nov 2018 07:26) (59 - 66)  BP: 148/74 (16 Nov 2018 07:26) (141/55 - 174/61)  BP(mean): --  RR: 25 (16 Nov 2018 07:26) (18 - 25)  SpO2: 96% (16 Nov 2018 07:26) (89% - 96%)    I&O's Summary    15 Nov 2018 07:01  -  16 Nov 2018 07:00  --------------------------------------------------------  IN: 470 mL / OUT: 1450 mL / NET: -980 mL          PHYSICAL EXAM:  TELE: AF 60s with  and inappropriate pacing  Constitutional: NAD, awake and alert, well-developed but frail  HEENT: DMM Mucous Membranes, Anicteric  Pulmonary: Non-labored, breath sounds with +crackles left base  Cardiovascular: Irregular, S1 and S2, No murmurs, rubs, gallops or clicks  Gastrointestinal: Bowel Sounds present, soft, nontender.   Lymph: 1+ peripheral edema in lower extremities. No lymphadenopathy.  Skin: No visible rashes or ulcers.  Psych:  Mood & affect appropriate    LABS: All Labs Reviewed:                        10.4 7.09  )-----------( 150      ( 15 Nov 2018 07:23 )             31.3                         11.0   11.08 )-----------( 150      ( 14 Nov 2018 08:41 )             33.1     16 Nov 2018 07:14    143    |  106    |  17     ----------------------------<  98     3.5     |  30     |  0.76   15 Nov 2018 07:23    141    |  107    |  21     ----------------------------<  105    3.5     |  27     |  0.89   14 Nov 2018 08:41    142    |  106    |  30     ----------------------------<  117    3.7     |  29     |  1.10     Ca    7.7        16 Nov 2018 07:14  Ca    7.6        15 Nov 2018 07:23  Ca    7.5        14 Nov 2018 08:41  Phos  2.1       14 Nov 2018 08:41  Mg     2.4       14 Nov 2018 08:41    TPro  x      /  Alb  2.5    /  TBili  x      /  DBili  x      /  AST  x      /  ALT  x      /  AlkPhos  x      14 Nov 2018 08:41      CARDIAC MARKERS ( 14 Nov 2018 08:41 )  .055 ng/mL / x     / 1076 U/L / x     / x        < from: 12 Lead ECG (11.12.18 @ 15:28) >  Ventricular Rate 67 BPM    Atrial Rate 70 BPM    QRS Duration 86 ms    Q-T Interval 432 ms    QTC Calculation(Bezet) 456 ms    R Axis 47 degrees    T Axis -74 degrees    Diagnosis Line Accelerated Junctional rhythm  ST & T wave abnormality, consider inferior ischemia  ST & T wave abnormality, consider anterolateral ischemia  Abnormal ECG    Confirmed by Christian Howell ([id number]) on 11/13/2018 10:34:32 AM    < end of copied text >    < from: Transthoracic Echocardiogram (09.06.18 @ 10:13) >  Patient name: LACIE SANCHEZ  YOB: 1935   Age: 83 (M)   MR#: [id number]  Study Date: 9/6/2018  Location: O/PSonographer: Annamarie Franks [facility]  Study quality: Technically fair  Referring Physician: JELENA TALBOT MD  Blood Pressure: 151/58 mmHg  Height: 173 cm  Weight: 103 kg  BSA: 2.2 m2  Heart Rate: 80 mmHg  ------------------------------------------------------------------------  PROCEDURE: Transthoracic echocardiogram with 2-D, M-Mode  and complete spectral and color flow Doppler.  INDICATION: Unspecified atrial fibrillation (I48.91)  ------------------------------------------------------------------------  Dimensions:    Normal Values:  LA:     5.0    2.0 - 4.0 cm  Ao:     3.4    2.0 - 3.8 cm  SEPTUM: 1.0    0.6 - 1.2 cm  PWT:    0.9    0.6 - 1.1 cm  LVIDd:  5.2    3.0 - 5.6 cm  LVIDs:  2.6    1.8 - 4.0 cm  Derived variables:  LVMI: 84 g/m2  RWT: 0.34  Fractional short: 50 %  EF (Visual Estimate): 60-65 %  EF (Modified Godinez Rule): 61 %Doppler Peak Velocity  (m/sec): AoV=1.6  ------------------------------------------------------------------------  Observations:  Mitral Valve: Mitral annular calcification, otherwise  normal mitral valve. Minimal mitral regurgitation.  Aortic Valve/Aorta: Aortic valve not well visualized;  appears calcified. Peak transaortic valve gradient equals  10 mm Hg. Minimal aortic regurgitation.  Peak left  ventricular outflow tract gradient equals 5 mm Hg.  Aortic Root: 3.4 cm.  Left Atrium: Severely dilated left atrium.  LA volume index  = 50 cc/m2.  Left Ventricle: Endocardium not well visualized; grossly  normal left ventricular systolic function. Normal left  ventricular internal dimensions and wall thicknesses.  Normal diastolic function  Right Heart: Right atrium not well visualized. The right  ventricle is not well visualized; grossly normal right  ventricular systolic function. A device wire is noted in  the right heart. Normal tricuspid valve. Mild tricuspid  regurgitation. Pulmonic valve not well visualized. Minimal  pulmonic regurgitation.  Pericardium/Pleura: Normal pericardium with trace  pericardial effusion.  Hemodynamic: Estimated right atrial pressure is 8 mm Hg.  Estimated right ventricular systolic pressure equals 49 mm  Hg, assuming right atrial pressure equals 8 mm Hg,  consistent with mild pulmonary hypertension.  ------------------------------------------------------------------------  Conclusions:  1. Mitral annular calcification, otherwise normal mitral  valve. Minimal mitral regurgitation.  2. Severely dilated left atrium.  LA volume index = 50  cc/m2.  3. Normal left ventricular internal dimensions and wall  thicknesses.  4. Endocardium not well visualized; grossly normal left  ventricular systolic function.  5. The right ventricle is not well visualized; grossly  normal right ventricular systolic function. A device wire  is noted in the right heart.  6. Estimated right ventricular systolic pressure equals 49  mm Hg, assuming right atrial pressure equals 8 mm Hg,  consistent with mild pulmonary hypertension.  *** Compared with echocardiogram of 9/9/2015, no  significant changes noted.  ------------------------------------------------------------------------  Confirmed on  9/6/2018 - 14:32:07 by Byron Garcia M.D.  ------------------------------------------------------------------------    < end of copied text >  < from: Xray Chest 1 View- PORTABLE-Urgent (11.15.18 @ 09:31) >  EXAM:  XR CHEST PORTABLE URGENT 1V                            PROCEDURE DATE:  11/15/2018          INTERPRETATION:  History: Pneumonia follow-up    Technique:  AP portable    Comparisons:  Chest x-ray dated 11/13/2018    Findings:     Airspace disease is present in the lower lobes particularly   in the retrocardiac space. Small left pleural effusion.  . Pacemaker   wires in right atrium and ventricle. Cardiomegaly.    The thorax is normal for age.    Impression: Cardiomegaly. Pacemaker. Bibasilar pneumonia, increased   density at lung bases. Small left pleural effusion                CAMPOS LIM M.D., ATTENDING RADIOLOGIST  This document has been electronically signed. Nov 15 2018 10:11AM        < end of copied text >

## 2018-11-16 NOTE — PROGRESS NOTE ADULT - ASSESSMENT
83 M with a history of HTN, AF on AC, PPM, BPH, chronic venous stasis p/w rhabdo and abn EKG.     - Tele with inappropriate pacing w/poss R-on-T.  Abbot called to interrogate [employer] Device.  Cont to monitor on tele  - Jm are more indicative of peripheral muscle breakdown rather than ACS.   Improving CK trending down.   - CXR with PNA ABX as per medicine  - No signs of ADHF. Lower ext edema chronic.   - Monitor closely for vol ol.  Currently no need for diuresis  - Currently EKG with AF with Diffuse TWI.   - Previous EKG is .   - Likely EKG changes may be from post pacing T wave changes (T wave memory)  - BP improving would resume home BP meds to maintain a SBP less than 140  - Please continue to maintain strict I/Os, monitor daily weights, Cr, and K.   - Further cardiac workup will depend on clinical course.   - All other workup per primary team. Will followup.     Faby Kraft NP-C  Cardiology 83 M with a history of HTN, AF on AC, PPM, BPH, chronic venous stasis p/w rhabdo and abn EKG. Found overnight with abnormal appearance of telemetry suggestive of undersensing. Interrogation revealed normal function with 3-6 month battery life, not pacer dependent    -despite abnormal telemetry appearance, pacemaker is functioning normally.  continue to monitor telemetery   - Jm were more indicative of peripheral muscle breakdown rather than ACS.   Improving CK trending down, not repeated since 11/14, no need given the favorable trend unless there is a new clinical event.   - though remains edematous, there are no signs of ADHF. Lower ext edema chronic from venous insufficiency; no need for aggressive diuresis at this time.   -remains in af with backup . not on any rate control meds and none need to be started at this time  - BP improving would resume home BP meds to maintain a SBP less than 140. was on amlodipine and benazepril; can resume the amlodipine, and pending the response, afterward can resume the benazepril  - Please continue to maintain strict I/Os, monitor daily weights, Cr, and K.   - Further cardiac workup will depend on clinical course.   - All other workup per primary team. Will followup.     Faby Kraft NP-C  Cardiology

## 2018-11-16 NOTE — PROGRESS NOTE ADULT - SUBJECTIVE AND OBJECTIVE BOX
pt  seen on  rounds  feels better  still has pain  from  fall  will need rehab  cxr shows infiltrate and effusions lytes  and  cbc  stable to continue iv  antibiotic  will need to transition to po  coumadin as pt  was on it before  hospitalization will d/c  vazquez  today as well  lungs  basilar crackles  cor  irregular rate abdomen  obese

## 2018-11-16 NOTE — PROGRESS NOTE ADULT - ASSESSMENT
83 white male with a history of HTN, AF, PPM, BPH now admitted with a history of fall. Had ARPITA on admission which improved with IVF resuscitation. labs and medications reviewed and patient can be discharged home from renal stand point.

## 2018-11-16 NOTE — PROGRESS NOTE ADULT - SUBJECTIVE AND OBJECTIVE BOX
LACIE SANCHEZ  83y  Male      CHIEF COMPLAINT: Patient is a 83y old  Male who presents with a chief complaint of fall abd back pain    HPI: 83 M with a history of HTN, AF on AC, PPM, BPH, chronic venous stasis p/w back pain. He was noted to have had a mechanical fall 2 days ago. He was sitting on the floor for 10-12 hours because of back pain. Then his back pain worsened and he came to ED. He has had chronic lower ext edema that has not worsened. Denies any chest pain, dyspnea, palpitations, PND, orthopnea, near syncope, syncope, stroke like symptoms. Currently feeling anxious.       PAST MEDICAL & SURGICAL HISTORY:  Pacemaker  Atrial fibrillation  BPH (benign prostatic hypertrophy)  Hypertension  repair of fracture of right wrist with hardware  Cardiac pacemaker (12 Nov 2018 19:01)      PAST MEDICAL & SURGICAL HISTORY:  Pacemaker  Atrial fibrillation  BPH (benign prostatic hypertrophy)  Hypertension  repair of fracture of right wrist with hardware  Cardiac pacemaker          PHYSICAL EXAM:    T(C): 36.8 (11-16-18 @ 12:31), Max: 37.1 (11-16-18 @ 04:42)  HR: 59 (11-16-18 @ 12:31) (59 - 66)  BP: 149/65 (11-16-18 @ 12:31) (141/55 - 174/61)  RR: 20 (11-16-18 @ 12:31) (18 - 25)  SpO2: 96% (11-16-18 @ 12:31) (94% - 96%)  Wt(kg): --    I&O's Detail    15 Nov 2018 07:01  -  16 Nov 2018 07:00  --------------------------------------------------------  IN:    Oral Fluid: 420 mL    Solution: 50 mL  Total IN: 470 mL    OUT:    Indwelling Catheter - Urethral: 1450 mL  Total OUT: 1450 mL    Total NET: -980 mL      16 Nov 2018 07:01  -  16 Nov 2018 13:58  --------------------------------------------------------  IN:    Oral Fluid: 360 mL  Total IN: 360 mL    OUT:    Indwelling Catheter - Urethral: 700 mL  Total OUT: 700 mL    Total NET: -340 mL          Respiratory: clear anteriorly, decreased BS at bases  Cardiovascular: S1 S2  Gastrointestinal: soft NT ND +BS  Extremities: trace edema   Neuro: Awake and alert    MEDICATIONS  (STANDING):  ALBUTerol    90 MICROgram(s) HFA Inhaler 1 Puff(s) Inhalation every 4 hours  ALBUTerol/ipratropium for Nebulization 3 milliLiter(s) Nebulizer every 6 hours  cefTRIAXone   IVPB 1 Gram(s) IV Intermittent every 24 hours  doxazosin 4 milliGRAM(s) Oral at bedtime  enoxaparin Injectable 40 milliGRAM(s) SubCutaneous daily  lidocaine   Patch 1 Patch Transdermal daily  pantoprazole  Injectable 40 milliGRAM(s) IV Push daily  tiotropium 18 MICROgram(s) Capsule 1 Capsule(s) Inhalation daily    MEDICATIONS  (PRN):  acetaminophen   Tablet .. 650 milliGRAM(s) Oral every 6 hours PRN Temp greater or equal to 38C (100.4F), Mild Pain (1 - 3)                            10.4   7.09  )-----------( 150      ( 15 Nov 2018 07:23 )             31.3       11-16    143  |  106  |  17  ----------------------------<  98  3.5   |  30  |  0.76    Ca    7.7<L>      16 Nov 2018 07:14

## 2018-11-17 PROCEDURE — 99232 SBSQ HOSP IP/OBS MODERATE 35: CPT

## 2018-11-17 RX ORDER — POTASSIUM CHLORIDE 20 MEQ
40 PACKET (EA) ORAL ONCE
Qty: 0 | Refills: 0 | Status: COMPLETED | OUTPATIENT
Start: 2018-11-17 | End: 2018-11-17

## 2018-11-17 RX ORDER — AMLODIPINE BESYLATE 2.5 MG/1
10 TABLET ORAL DAILY
Qty: 0 | Refills: 0 | Status: DISCONTINUED | OUTPATIENT
Start: 2018-11-17 | End: 2018-11-19

## 2018-11-17 RX ADMIN — LIDOCAINE 1 PATCH: 4 CREAM TOPICAL at 23:30

## 2018-11-17 RX ADMIN — LIDOCAINE 1 PATCH: 4 CREAM TOPICAL at 20:55

## 2018-11-17 RX ADMIN — Medication 3 MILLILITER(S): at 07:34

## 2018-11-17 RX ADMIN — Medication 3 MILLILITER(S): at 01:35

## 2018-11-17 RX ADMIN — PANTOPRAZOLE SODIUM 40 MILLIGRAM(S): 20 TABLET, DELAYED RELEASE ORAL at 10:41

## 2018-11-17 RX ADMIN — LIDOCAINE 1 PATCH: 4 CREAM TOPICAL at 10:39

## 2018-11-17 RX ADMIN — Medication 40 MILLIEQUIVALENT(S): at 13:39

## 2018-11-17 RX ADMIN — CEFTRIAXONE 100 GRAM(S): 500 INJECTION, POWDER, FOR SOLUTION INTRAMUSCULAR; INTRAVENOUS at 21:50

## 2018-11-17 RX ADMIN — Medication 3 MILLILITER(S): at 13:33

## 2018-11-17 RX ADMIN — ENOXAPARIN SODIUM 40 MILLIGRAM(S): 100 INJECTION SUBCUTANEOUS at 10:38

## 2018-11-17 RX ADMIN — Medication 4 MILLIGRAM(S): at 21:50

## 2018-11-17 RX ADMIN — Medication 3 MILLILITER(S): at 19:50

## 2018-11-17 NOTE — PROGRESS NOTE ADULT - SUBJECTIVE AND OBJECTIVE BOX
[facility] -- Shar Thomas, Ananya, Latesha, Js Daigle Savella  Office # [phone]      Follow Up:  Rhabdo and abnormal ECG    Subjective/Observations: Seen and examined.  Sitting up in bed with no new complaints and feels stronger today.  Pt denies cp, sob or palpitations.  No events overnight.  NAD.        REVIEW OF SYSTEMS: All other review of systems is negative unless indicated above    PAST MEDICAL & SURGICAL HISTORY:  Pacemaker  Atrial fibrillation  BPH (benign prostatic hypertrophy)  Hypertension  repair of fracture of right wrist with hardware  Cardiac pacemaker      MEDICATIONS  (STANDING):  ALBUTerol    90 MICROgram(s) HFA Inhaler 1 Puff(s) Inhalation every 4 hours  ALBUTerol/ipratropium for Nebulization 3 milliLiter(s) Nebulizer every 6 hours  cefTRIAXone   IVPB 1 Gram(s) IV Intermittent every 24 hours  doxazosin 4 milliGRAM(s) Oral at bedtime  enoxaparin Injectable 40 milliGRAM(s) SubCutaneous daily  lidocaine   Patch 1 Patch Transdermal daily  pantoprazole  Injectable 40 milliGRAM(s) IV Push daily  tiotropium 18 MICROgram(s) Capsule 1 Capsule(s) Inhalation daily    MEDICATIONS  (PRN):  acetaminophen   Tablet .. 650 milliGRAM(s) Oral every 6 hours PRN Temp greater or equal to 38C (100.4F), Mild Pain (1 - 3)      Allergies    lactose (Diarrhea)  penicillin (Hives)    Intolerances            Vital Signs Last 24 Hrs  T(C): 36.6 (17 Nov 2018 11:22), Max: 36.8 (17 Nov 2018 00:04)  T(F): 97.8 (17 Nov 2018 11:22), Max: 98.3 (17 Nov 2018 00:04)  HR: 60 (17 Nov 2018 11:22) (59 - 72)  BP: 156/66 (17 Nov 2018 11:22) (146/65 - 167/74)  BP(mean): --  RR: 18 (17 Nov 2018 11:22) (17 - 18)  SpO2: 92% (17 Nov 2018 11:22) (92% - 97%)    I&O's Summary    16 Nov 2018 07:01  -  17 Nov 2018 07:00  --------------------------------------------------------  IN: 700 mL / OUT: 1560 mL / NET: -860 mL    17 Nov 2018 07:01  -  17 Nov 2018 13:05  --------------------------------------------------------  IN: 360 mL / OUT: 500 mL / NET: -140 mL          PHYSICAL EXAM:  TELE:  60s  Constitutional: NAD, awake and alert, well-developed, obese  HEENT: Moist Mucous Membranes, Anicteric  Pulmonary: Non-labored, breath sounds are decreased right base, No wheezing, rales or rhonchi  Cardiovascular: Regular, S1 and S2, No murmurs, rubs, gallops or clicks  Gastrointestinal: Bowel Sounds present, soft, nontender.   Lymph: 1+ peripheral edema. No lymphadenopathy.  Skin: No visible rashes or ulcers.  Psych:  Mood & affect appropriate    LABS: All Labs Reviewed:                        10.4   7.09  )-----------( 150      ( 15 Nov 2018 07:23 )             31.3     16 Nov 2018 07:14    143    |  106    |  17     ----------------------------<  98     3.5     |  30     |  0.76   15 Nov 2018 07:23    141    |  107    |  21     ----------------------------<  105    3.5     |  27     |  0.89     Ca    7.7        16 Nov 2018 07:14  Ca    7.6        15 Nov 2018 07:23    < from: 12 Lead ECG (11.12.18 @ 15:28) >  Ventricular Rate 67 BPM    Atrial Rate 70 BPM    QRS Duration 86 ms    Q-T Interval 432 ms    QTC Calculation(Bezet) 456 ms    R Axis 47 degrees    T Axis -74 degrees    Diagnosis Line Accelerated Junctional rhythm  ST & T wave abnormality, consider inferior ischemia  ST & T wave abnormality, consider anterolateral ischemia  Abnormal ECG    Confirmed by Christian Howell ([id number]) on 11/13/2018 10:34:32 AM    < end of copied text >    < from: Transthoracic Echocardiogram (09.06.18 @ 10:13) >  Patient name: LACIE SANCHEZ  YOB: 1935   Age: 83 (M)   MR#: [id number]  Study Date: 9/6/2018  Location: O/PSonographer: Annamarie [facility]anks [facility]  Study quality: Technically fair  Referring Physician: JELENA TALBOT MD  Blood Pressure: 151/58 mmHg  Height: 173 cm  Weight: 103 kg  BSA: 2.2 m2  Heart Rate: 80 mmHg  ------------------------------------------------------------------------  PROCEDURE: Transthoracic echocardiogram with 2-D, M-Mode  and complete spectral and color flow Doppler.  INDICATION: Unspecified atrial fibrillation (I48.91)  ------------------------------------------------------------------------  Dimensions:    Normal Values:  LA:     5.0    2.0 - 4.0 cm  Ao:     3.4    2.0 - 3.8 cm  SEPTUM: 1.0    0.6 - 1.2 cm  PWT:    0.9    0.6 - 1.1 cm  LVIDd:  5.2    3.0 - 5.6 cm  LVIDs:  2.6    1.8 - 4.0 cm  Derived variables:  LVMI: 84 g/m2  RWT: 0.34  Fractional short: 50 %  EF (Visual Estimate): 60-65 %  EF (Modified Godinez Rule): 61 %Doppler Peak Velocity  (m/sec): AoV=1.6  ------------------------------------------------------------------------  Observations:  Mitral Valve: Mitral annular calcification, otherwise  normal mitral valve. Minimal mitral regurgitation.  Aortic Valve/Aorta: Aortic valve not well visualized;  appears calcified. Peak transaortic valve gradient equals  10 mm Hg. Minimal aortic regurgitation.  Peak left  ventricular outflow tract gradient equals 5 mm Hg.  Aortic Root: 3.4 cm.  Left Atrium: Severely dilated left atrium.  LA volume index  = 50 cc/m2.  Left Ventricle: Endocardium not well visualized; grossly  normal left ventricular systolic function. Normal left  ventricular internal dimensions and wall thicknesses.  Normal diastolic function  Right Heart: Right atrium not well visualized. The right  ventricle is not well visualized; grossly normal right  ventricular systolic function. A device wire is noted in  the right heart. Normal tricuspid valve. Mild tricuspid  regurgitation. Pulmonic valve not well visualized. Minimal  pulmonic regurgitation.  Pericardium/Pleura: Normal pericardium with trace  pericardial effusion.  Hemodynamic: Estimated right atrial pressure is 8 mm Hg.  Estimated right ventricular systolic pressure equals 49 mm  Hg, assuming right atrial pressure equals 8 mm Hg,  consistent with mild pulmonary hypertension.  ------------------------------------------------------------------------  Conclusions:  1. Mitral annular calcification, otherwise normal mitral  valve. Minimal mitral regurgitation.  2. Severely dilated left atrium.  LA volume index = 50  cc/m2.  3. Normal left ventricular internal dimensions and wall  thicknesses.  4. Endocardium not well visualized; grossly normal left  ventricular systolic function.  5. The right ventricle is not well visualized; grossly  normal right ventricular systolic function. A device wire  is noted in the right heart.  6. Estimated right ventricular systolic pressure equals 49  mm Hg, assuming right atrial pressure equals 8 mm Hg,  consistent with mild pulmonary hypertension.  *** Compared with echocardiogram of 9/9/2015, no  significant changes noted.  ------------------------------------------------------------------------  Confirmed on  9/6/2018 - 14:32:07 by Byron Garcia M.D.  ------------------------------------------------------------------------    < end of copied text >    < from: Xray Chest 1 View- PORTABLE-Urgent (11.15.18 @ 09:31) >  EXAM:  XR CHEST PORTABLE URGENT 1V                            PROCEDURE DATE:  11/15/2018          INTERPRETATION:  History: Pneumonia follow-up    Technique:  AP portable    Comparisons:  Chest x-ray dated 11/13/2018    Findings:     Airspace disease is present in the lower lobes particularly   in the retrocardiac space. Small left pleural effusion.  . Pacemaker   wires in right atrium and ventricle. Cardiomegaly.    The thorax is normal for age.    Impression: Cardiomegaly. Pacemaker. Bibasilar pneumonia, increased   density at lung bases. Small left pleural effusion                CAMPOS LIM M.D., ATTENDING RADIOLOGIST  This document has been electronically signed. Nov 15 2018 10:11AM        < end of copied text >

## 2018-11-17 NOTE — PROGRESS NOTE ADULT - SUBJECTIVE AND OBJECTIVE BOX
PCP  Subjective:       Objective:   Vital Signs Last 24 Hrs  T(C): 37.2 (18 @ 16:17), Max: 37.2 (18 @ 16:17)  T(F): 98.9 (18 @ 16:17), Max: 98.9 (18 @ 16:17)  HR: 60 (18 @ 16:17) (59 - 72)  BP: 162/69 (18 @ 16:17) (146/65 - 167/74)  BP(mean): --  RR: 17 (18 @ 16:17) (17 - 18)  SpO2: 90% (18 @ 16:17) (90% - 97%)  Daily     Daily Weight in k.3 (2018 04:22)    GENERAL:    EYES:   NECK:   CHEST/LUNG:   HEART:   ABDOMEN:    EXTREMITIES:    SKIN:    CNS:      Allergies: Allergies    lactose (Diarrhea)  penicillin (Hives)    Intolerances        Home Medications:  amlodipine-benazepril 10 mg-20 mg oral capsule: 1 cap(s) orally once a day (2018 19:15)  terazosin 5 mg oral tablet: orally once a day (2018 19:15)  warfarin 4 mg oral tablet: 1 tab(s) orally once a day (2018 19:15)    Medications:   acetaminophen   Tablet .. 650 milliGRAM(s) Oral every 6 hours PRN  ALBUTerol    90 MICROgram(s) HFA Inhaler 1 Puff(s) Inhalation every 4 hours  ALBUTerol/ipratropium for Nebulization 3 milliLiter(s) Nebulizer every 6 hours  amLODIPine   Tablet 10 milliGRAM(s) Oral daily  cefTRIAXone   IVPB 1 Gram(s) IV Intermittent every 24 hours  doxazosin 4 milliGRAM(s) Oral at bedtime  enoxaparin Injectable 40 milliGRAM(s) SubCutaneous daily  lidocaine   Patch 1 Patch Transdermal daily  pantoprazole  Injectable 40 milliGRAM(s) IV Push daily  tiotropium 18 MICROgram(s) Capsule 1 Capsule(s) Inhalation daily      LABS:        143  |  106  |  17  ----------------------------<  98  3.5   |  30  |  0.76    Ca    7.7<L>      2018 07:14         @ 07:16  INR 2.64        CAPILLARY BLOOD GLUCOSE              RECENT CULTURES:  Culture Results:   No growth ( @ 21:48)  Culture Results:   No growth ( @ 00:19)  Culture Results:   Growth in aerobic and anaerobic bottles: Streptococcus agalactiae (Group  B)  See previous culture [id number] ( @ 22:06)  Culture Results:   Growth in aerobic and anaerobic bottles: Streptococcus agalactiae (Group  B)  "Due to technical problems, Proteus sp. will Not be reported as part of  the BCID panel until further notice"  ***Blood Panel PCR results on this specimen are available  approximately 3 hours after the Gram stain result.***  Gram stain, PCR, and/or culture results may not always  correspond due to difference in methodologies.  ************************************************************  This PCR assay was performed using [employer].  The following targets are tested for: Enterococcus,  vancomycin resistant enterococci, Listeria monocytogenes,  coagulase negative staphylococci, S. aureus,  methicillin resistant S. aureus, Streptococcus agalactiae  (Group B), S. pneumoniae, S. pyogenes (Group A),  Acinetobacter baumannii, Enterobacter cloacae, E. coli,  Klebsiella oxytoca, K. pneumoniae, Proteus sp.,  Serratia marcescens, Haemophilus influenzae,  Neisseria meningitidis, Pseudomonas aeruginosa, Candida  albicans, C. glabrata, C krusei, C parapsilosis,  C. tropicalis and the KPC resistance gene. (:06)        Culture - Urine (collected 18 @ 21:48)  Source: .Urine Catheterized  Final Report (18 @ 21:06):    No growth    Culture - Urine (collected 18 @ 00:19)  Source: .Urine Clean Catch (Midstream)  Final Report (18 @ 22:26):    No growth    Culture - Blood (collected 18 @ 22:06)  Source: .Blood Blood-Peripheral  Gram Stain (18 @ 08:05):    Growth in aerobic bottle:    Gram Positive Cocci in Pairs and Chains    Growth in anaerobic bottle:    Gram Positive Cocci in Pairs and Chains  Final Report (11-15-18 @ 07:48):    Growth in aerobic and anaerobic bottles: Streptococcus agalactiae (Group    B)    "Due to technical problems, Proteus sp. will Not be reported as part of    the BCID panel until further notice"    ***Blood Panel PCR results on this specimen are available    approximately 3 hours after the Gram stain result.***    Gram stain, PCR, and/or culture results may not always    correspond due to difference in methodologies.    ************************************************************    This PCR assay was performed using [employer].    The following targets are tested for: Enterococcus,    vancomycin resistant enterococci, Listeria monocytogenes,    coagulase negative staphylococci, S. aureus,    methicillin resistant S. aureus, Streptococcus agalactiae    (Group B), S. pneumoniae, S. pyogenes (Group A),    Acinetobacter baumannii, Enterobacter cloacae, E. coli,    Klebsiella oxytoca, K. pneumoniae, Proteus sp.,    Serratia marcescens, Haemophilus influenzae,    Neisseria meningitidis, Pseudomonas aeruginosa, Candida    albicans, C. glabrata, C krusei, C parapsilosis,    C. tropicalis and the KPC resistance gene.  Organism: Blood Culture PCR  Streptococcus agalactiae (Group B) (11-15-18 @ 07:48)  Organism: Streptococcus agalactiae (Group B) (11-15-18 @ 07:48)      -  Ceftriaxone: S 0.125      Method Type: ETEST  Organism: Streptococcus agalactiae (Group B) (11-15-18 @ 07:48)      -  Clindamycin: S      -  Levofloxacin: S      -  Penicillin: S Predicts results for ampicillin, amoxicillin, amoxicillin/clavulanate, ampicillin/sulbactam, 1st, 2nd and 3rd generation cephalosporins and carbapenems.      -  Vancomycin: S      Method Type: KB  Organism: Blood Culture PCR (11-15-18 @ 07:48)      -  Streptococcus agalactiae (Group B): Detec      Method Type: PCR    Culture - Blood (collected 18 @ 22:06)  Source: .Blood Blood-Peripheral  Gram Stain (18 @ 06:34):    Growth in aerobic and anaerobic bottles:    Gram Positive Cocci in Pairs and Chains  Final Report (11-15-18 @ 07:48):    Growth in aerobic and anaerobic bottles: Streptococcus agalactiae (Group    B)    See previous culture [id number] PCP  Subjective:   patient in bed , awake , alert , responsive still with some  cough    Objective:   Vital Signs Last 24 Hrs  T(C): 37.2 (18 @ 16:17), Max: 37.2 (18 @ 16:17)  T(F): 98.9 (18 @ 16:17), Max: 98.9 (18 @ 16:17)  HR: 60 (18 @ 16:17) (59 - 72)  BP: 162/69 (18 @ 16:17) (146/65 - 167/74)  BP(mean): --  RR: 17 (18 @ 16:17) (17 - 18)  SpO2: 90% (18 @ 16:17) (90% - 97%)  Daily     Daily Weight in k.3 (2018 04:22)    GENERAL:  wdwn male , alert   EYES: eomi  NECK: supple  CHEST/LUNG: rales in bases  HEART: s1 s2 irregular   ABDOMEN:  soft nt + bs distended  EXTREMITIES:  trace edema   SKIN:  warm   CNS:  awake , alert non focal cn2-12 intact    Allergies: Allergies    lactose (Diarrhea)  penicillin (Hives)    Intolerances        Home Medications:  amlodipine-benazepril 10 mg-20 mg oral capsule: 1 cap(s) orally once a day (2018 19:15)  terazosin 5 mg oral tablet: orally once a day (2018 19:15)  warfarin 4 mg oral tablet: 1 tab(s) orally once a day (2018 19:15)    Medications:   acetaminophen   Tablet .. 650 milliGRAM(s) Oral every 6 hours PRN  ALBUTerol    90 MICROgram(s) HFA Inhaler 1 Puff(s) Inhalation every 4 hours  ALBUTerol/ipratropium for Nebulization 3 milliLiter(s) Nebulizer every 6 hours  amLODIPine   Tablet 10 milliGRAM(s) Oral daily  cefTRIAXone   IVPB 1 Gram(s) IV Intermittent every 24 hours  doxazosin 4 milliGRAM(s) Oral at bedtime  enoxaparin Injectable 40 milliGRAM(s) SubCutaneous daily  lidocaine   Patch 1 Patch Transdermal daily  pantoprazole  Injectable 40 milliGRAM(s) IV Push daily  tiotropium 18 MICROgram(s) Capsule 1 Capsule(s) Inhalation daily      LABS:        143  |  106  |  17  ----------------------------<  98  3.5   |  30  |  0.76    Ca    7.7<L>      2018 07:14         @ 07:16  INR 2.64        CAPILLARY BLOOD GLUCOSE              RECENT CULTURES:  Culture Results:   No growth ( @ 21:48)  Culture Results:   No growth ( @ 00:19)  Culture Results:   Growth in aerobic and anaerobic bottles: Streptococcus agalactiae (Group  B)  See previous culture [id number] ( @ 22:06)  Culture Results:   Growth in aerobic and anaerobic bottles: Streptococcus agalactiae (Group  B)  "Due to technical problems, Proteus sp. will Not be reported as part of  the BCID panel until further notice"  ***Blood Panel PCR results on this specimen are available  approximately 3 hours after the Gram stain result.***  Gram stain, PCR, and/or culture results may not always  correspond due to difference in methodologies.  ************************************************************  This PCR assay was performed using [employer].  The following targets are tested for: Enterococcus,  vancomycin resistant enterococci, Listeria monocytogenes,  coagulase negative staphylococci, S. aureus,  methicillin resistant S. aureus, Streptococcus agalactiae  (Group B), S. pneumoniae, S. pyogenes (Group A),  Acinetobacter baumannii, Enterobacter cloacae, E. coli,  Klebsiella oxytoca, K. pneumoniae, Proteus sp.,  Serratia marcescens, Haemophilus influenzae,  Neisseria meningitidis, Pseudomonas aeruginosa, Candida  albicans, C. glabrata, C krusei, C parapsilosis,  C. tropicalis and the KPC resistance gene. ( @ 22:06)        Culture - Urine (collected 18 @ 21:48)  Source: .Urine Catheterized  Final Report (18 @ 21:06):    No growth    Culture - Urine (collected 18 @ 00:19)  Source: .Urine Clean Catch (Midstream)  Final Report (18 @ 22:26):    No growth    Culture - Blood (collected 18 @ 22:06)  Source: .Blood Blood-Peripheral  Gram Stain (18 @ 08:05):    Growth in aerobic bottle:    Gram Positive Cocci in Pairs and Chains    Growth in anaerobic bottle:    Gram Positive Cocci in Pairs and Chains  Final Report (11-15-18 @ 07:48):    Growth in aerobic and anaerobic bottles: Streptococcus agalactiae (Group    B)    "Due to technical problems, Proteus sp. will Not be reported as part of    the BCID panel until further notice"    ***Blood Panel PCR results on this specimen are available    approximately 3 hours after the Gram stain result.***    Gram stain, PCR, and/or culture results may not always    correspond due to difference in methodologies.    ************************************************************    This PCR assay was performed using [employer].    The following targets are tested for: Enterococcus,    vancomycin resistant enterococci, Listeria monocytogenes,    coagulase negative staphylococci, S. aureus,    methicillin resistant S. aureus, Streptococcus agalactiae    (Group B), S. pneumoniae, S. pyogenes (Group A),    Acinetobacter baumannii, Enterobacter cloacae, E. coli,    Klebsiella oxytoca, K. pneumoniae, Proteus sp.,    Serratia marcescens, Haemophilus influenzae,    Neisseria meningitidis, Pseudomonas aeruginosa, Candida    albicans, C. glabrata, C krusei, C parapsilosis,    C. tropicalis and the KPC resistance gene.  Organism: Blood Culture PCR  Streptococcus agalactiae (Group B) (11-15-18 @ 07:48)  Organism: Streptococcus agalactiae (Group B) (11-15-18 @ 07:48)      -  Ceftriaxone: S 0.125      Method Type: ETEST  Organism: Streptococcus agalactiae (Group B) (11-15-18 @ 07:48)      -  Clindamycin: S      -  Levofloxacin: S      -  Penicillin: S Predicts results for ampicillin, amoxicillin, amoxicillin/clavulanate, ampicillin/sulbactam, 1st, 2nd and 3rd generation cephalosporins and carbapenems.      -  Vancomycin: S      Method Type: KB  Organism: Blood Culture PCR (11-15-18 @ 07:48)      -  Streptococcus agalactiae (Group B): Detec      Method Type: PCR    Culture - Blood (collected 18 @ 22:06)  Source: .Blood Blood-Peripheral  Gram Stain (18 @ 06:34):    Growth in aerobic and anaerobic bottles:    Gram Positive Cocci in Pairs and Chains  Final Report (11-15-18 @ 07:48):    Growth in aerobic and anaerobic bottles: Streptococcus agalactiae (Group    B)    See previous culture [id number]

## 2018-11-17 NOTE — PROGRESS NOTE ADULT - ASSESSMENT
CHIEF COMPLAINT: Patient is a 83y old  Male who presents with a chief complaint of fall abd back pain    HPI: 83 M with a history of HTN, AF on AC, PPM, BPH, chronic venous stasis p/w back pain. He was noted to have had a mechanical fall 2 days ago. He was sitting on the floor for 10-12 hours because of back pain. Then his back pain worsened and he came to ED. He has had chronic lower ext edema that has not worsened. Denies any chest pain, dyspnea, palpitations, PND, orthopnea, near syncope, syncope, stroke like symptoms. Currently feeling anxious.     EKG: AF with diffuse T wave inversions    REVIEW OF SYSTEMS:   All other review of systems are negative unless indicated above    PAST MEDICAL & SURGICAL HISTORY:  Pacemaker  Atrial fibrillation  BPH (benign prostatic hypertrophy)  Hypertension  repair of fracture of right wrist with hardware  Cardiac pacemaker      admit for rhabdomyolysis , post mechanical; fall , and iv hydration , and monitor , icu eval , f/i cpk ,   on 11/17/18 treat for pneumonia with IV abx , will need to start anticoag when stable , consider laxatives for constipation

## 2018-11-17 NOTE — PROGRESS NOTE ADULT - ASSESSMENT
83 M with a history of HTN, AF on AC, PPM, BPH, chronic venous stasis p/w rhabdo and abn EKG. Found overnight with abnormal appearance of telemetry suggestive of undersensing. Interrogation revealed normal function with 3-6 month battery life, not pacer dependent    -despite abnormal telemetry appearance, pacemaker is functioning normally.  continue to monitor telemetery   - Jm were more indicative of peripheral muscle breakdown rather than ACS.   Improving CK trending down, not repeated since 11/14, no need given the favorable trend unless there is a new clinical event.   - though remains edematous, there are no signs of ADHF. Lower ext edema chronic from venous insufficiency; no need for aggressive diuresis at this time.   -remains in af with backup . not on any rate control meds and none need to be started at this time  - BP improving would resume home BP meds to maintain a SBP less than 140. Was on amlodipine and benazepril; Amlodipine resumed today pending the response, afterward can resume the benazepril  - Please continue to maintain strict I/Os, monitor daily weights, Cr, and K.   - Further cardiac workup will depend on clinical course.   - All other workup per primary team. Will followup.     Faby Kraft NP-C  Cardiology 83 M with a history of HTN, AF on AC, PPM, BPH, chronic venous stasis p/w rhabdo and abn EKG. Found overnight with abnormal appearance of telemetry suggestive of undersensing. Interrogation revealed normal function with 3-6 month battery life, not pacer dependent    - despite abnormal telemetry appearance, pacemaker is functioning normally.  continue to monitor telemetery    - though remains edematous, there are no signs of ADHF. Lower ext edema chronic from venous insufficiency; no need for aggressive diuresis at this time.   - remains in af with backup . not on any rate control meds and none need to be started at this time  - BP improving would resume home BP meds to maintain a SBP less than 140. Was on amlodipine and benazepril; Amlodipine resumed 11/17.  will assess the response, afterward can resume the benazepril if needed  - Please continue to maintain strict I/Os, monitor daily weights, Cr, and K.   - Further cardiac workup will depend on clinical course.   - All other workup per primary team. Will followup.     Faby Kraft NP-C  Cardiology

## 2018-11-18 LAB
INR BLD: 1.4 RATIO — HIGH (ref 0.88–1.16)
PROTHROM AB SERPL-ACNC: 16.1 SEC — HIGH (ref 10–12.9)

## 2018-11-18 PROCEDURE — 99232 SBSQ HOSP IP/OBS MODERATE 35: CPT

## 2018-11-18 RX ADMIN — LIDOCAINE 1 PATCH: 4 CREAM TOPICAL at 21:13

## 2018-11-18 RX ADMIN — Medication 3 MILLILITER(S): at 13:30

## 2018-11-18 RX ADMIN — CEFTRIAXONE 100 GRAM(S): 500 INJECTION, POWDER, FOR SOLUTION INTRAMUSCULAR; INTRAVENOUS at 21:17

## 2018-11-18 RX ADMIN — ENOXAPARIN SODIUM 40 MILLIGRAM(S): 100 INJECTION SUBCUTANEOUS at 11:10

## 2018-11-18 RX ADMIN — PANTOPRAZOLE SODIUM 40 MILLIGRAM(S): 20 TABLET, DELAYED RELEASE ORAL at 11:13

## 2018-11-18 RX ADMIN — Medication 4 MILLIGRAM(S): at 21:16

## 2018-11-18 RX ADMIN — LIDOCAINE 1 PATCH: 4 CREAM TOPICAL at 11:10

## 2018-11-18 RX ADMIN — Medication 650 MILLIGRAM(S): at 22:00

## 2018-11-18 RX ADMIN — Medication 3 MILLILITER(S): at 07:19

## 2018-11-18 RX ADMIN — Medication 3 MILLILITER(S): at 20:10

## 2018-11-18 RX ADMIN — Medication 650 MILLIGRAM(S): at 21:21

## 2018-11-18 RX ADMIN — AMLODIPINE BESYLATE 10 MILLIGRAM(S): 2.5 TABLET ORAL at 05:23

## 2018-11-18 NOTE — PROGRESS NOTE ADULT - SUBJECTIVE AND OBJECTIVE BOX
PCP  Subjective:   in bed , had BM today , mild sob , feels better , aware to start coumadin was taking 4 mg a day at home     Objective:   Vital Signs Last 24 Hrs  T(C): 36.9 (18 @ 11:30), Max: 37.7 (18 @ 07:58)  T(F): 98.5 (18 @ 11:30), Max: 99.8 (18 @ 07:58)  HR: 70 (18 @ 13:33) (58 - 72)  BP: 142/63 (18 @ 11:30) (142/63 - 185/75)  BP(mean): --  RR: 18 (18 @ 11:30) (17 - 19)  SpO2: 95% (18 @ 13:33) (90% - 96%)  Daily     Daily Weight in k.3 (2018 04:26)      GENERAL:  wdwn male , alert   EYES: eomi  NECK: supple  CHEST/LUNG: rales in bases  HEART: s1 s2 irregular   ABDOMEN:  soft nt + bs distended  EXTREMITIES:  trace edema   SKIN:  warm   CNS:  awake , alert non focal cn2-12 intact      Allergies: Allergies    lactose (Diarrhea)  penicillin (Hives)    Intolerances        Home Medications:  amlodipine-benazepril 10 mg-20 mg oral capsule: 1 cap(s) orally once a day (2018 19:15)  terazosin 5 mg oral tablet: orally once a day (2018 19:15)  warfarin 4 mg oral tablet: 1 tab(s) orally once a day (2018 19:15)    Medications:   acetaminophen   Tablet .. 650 milliGRAM(s) Oral every 6 hours PRN  ALBUTerol    90 MICROgram(s) HFA Inhaler 1 Puff(s) Inhalation every 4 hours  ALBUTerol/ipratropium for Nebulization 3 milliLiter(s) Nebulizer every 6 hours  amLODIPine   Tablet 10 milliGRAM(s) Oral daily  cefTRIAXone   IVPB 1 Gram(s) IV Intermittent every 24 hours  doxazosin 4 milliGRAM(s) Oral at bedtime  enoxaparin Injectable 40 milliGRAM(s) SubCutaneous daily  lidocaine   Patch 1 Patch Transdermal daily  pantoprazole  Injectable 40 milliGRAM(s) IV Push daily  tiotropium 18 MICROgram(s) Capsule 1 Capsule(s) Inhalation daily      LABS:                  CAPILLARY BLOOD GLUCOSE              RECENT CULTURES:  Culture Results:   No growth ( @ 21:48)  Culture Results:   No growth ( @ 00:19)  Culture Results:   Growth in aerobic and anaerobic bottles: Streptococcus agalactiae (Group  B)  See previous culture [id number] ( @ 22:06)  Culture Results:   Growth in aerobic and anaerobic bottles: Streptococcus agalactiae (Group  B)  "Due to technical problems, Proteus sp. will Not be reported as part of  the BCID panel until further notice"  ***Blood Panel PCR results on this specimen are available  approximately 3 hours after the Gram stain result.***  Gram stain, PCR, and/or culture results may not always  correspond due to difference in methodologies.  ************************************************************  This PCR assay was performed using [employer].  The following targets are tested for: Enterococcus,  vancomycin resistant enterococci, Listeria monocytogenes,  coagulase negative staphylococci, S. aureus,  methicillin resistant S. aureus, Streptococcus agalactiae  (Group B), S. pneumoniae, S. pyogenes (Group A),  Acinetobacter baumannii, Enterobacter cloacae, E. coli,  Klebsiella oxytoca, K. pneumoniae, Proteus sp.,  Serratia marcescens, Haemophilus influenzae,  Neisseria meningitidis, Pseudomonas aeruginosa, Candida  albicans, C. glabrata, C krusei, C parapsilosis,  C. tropicalis and the KPC resistance gene. (:06)        Culture - Urine (collected 18 @ 21:48)  Source: .Urine Catheterized  Final Report (18 @ 21:06):    No growth    Culture - Urine (collected 18 @ 00:19)  Source: .Urine Clean Catch (Midstream)  Final Report (18 @ 22:26):    No growth    Culture - Blood (collected 18 @ 22:06)  Source: .Blood Blood-Peripheral  Gram Stain (18 @ 08:05):    Growth in aerobic bottle:    Gram Positive Cocci in Pairs and Chains    Growth in anaerobic bottle:    Gram Positive Cocci in Pairs and Chains  Final Report (11-15-18 @ 07:48):    Growth in aerobic and anaerobic bottles: Streptococcus agalactiae (Group    B)    "Due to technical problems, Proteus sp. will Not be reported as part of    the BCID panel until further notice"    ***Blood Panel PCR results on this specimen are available    approximately 3 hours after the Gram stain result.***    Gram stain, PCR, and/or culture results may not always    correspond due to difference in methodologies.    ************************************************************    This PCR assay was performed using [employer].    The following targets are tested for: Enterococcus,    vancomycin resistant enterococci, Listeria monocytogenes,    coagulase negative staphylococci, S. aureus,    methicillin resistant S. aureus, Streptococcus agalactiae    (Group B), S. pneumoniae, S. pyogenes (Group A),    Acinetobacter baumannii, Enterobacter cloacae, E. coli,    Klebsiella oxytoca, K. pneumoniae, Proteus sp.,    Serratia marcescens, Haemophilus influenzae,    Neisseria meningitidis, Pseudomonas aeruginosa, Candida    albicans, C. glabrata, C krusei, C parapsilosis,    C. tropicalis and the KPC resistance gene.  Organism: Blood Culture PCR  Streptococcus agalactiae (Group B) (11-15-18 @ 07:48)  Organism: Streptococcus agalactiae (Group B) (11-15-18 @ 07:48)      -  Ceftriaxone: S 0.125      Method Type: ETEST  Organism: Streptococcus agalactiae (Group B) (11-15-18 @ 07:48)      -  Clindamycin: S      -  Levofloxacin: S      -  Penicillin: S Predicts results for ampicillin, amoxicillin, amoxicillin/clavulanate, ampicillin/sulbactam, 1st, 2nd and 3rd generation cephalosporins and carbapenems.      -  Vancomycin: S      Method Type: KB  Organism: Blood Culture PCR (11-15-18 @ 07:48)      -  Streptococcus agalactiae (Group B): Detec      Method Type: PCR    Culture - Blood (collected 18 @ 22:06)  Source: .Blood Blood-Peripheral  Gram Stain (18 @ 06:34):    Growth in aerobic and anaerobic bottles:    Gram Positive Cocci in Pairs and Chains  Final Report (11-15-18 @ 07:48):    Growth in aerobic and anaerobic bottles: Streptococcus agalactiae (Group    B)    See previous culture [id number]

## 2018-11-18 NOTE — PROGRESS NOTE ADULT - ASSESSMENT
CHIEF COMPLAINT: Patient is a 83y old  Male who presents with a chief complaint of fall abd back pain    HPI: 83 M with a history of HTN, AF on AC, PPM, BPH, chronic venous stasis p/w back pain. He was noted to have had a mechanical fall 2 days ago. He was sitting on the floor for 10-12 hours because of back pain. Then his back pain worsened and he came to ED. He has had chronic lower ext edema that has not worsened. Denies any chest pain, dyspnea, palpitations, PND, orthopnea, near syncope, syncope, stroke like symptoms. Currently feeling anxious.     EKG: AF with diffuse T wave inversions    REVIEW OF SYSTEMS:   All other review of systems are negative unless indicated above    PAST MEDICAL & SURGICAL HISTORY:  Pacemaker  Atrial fibrillation  BPH (benign prostatic hypertrophy)  Hypertension  repair of fracture of right wrist with hardware  Cardiac pacemaker      admit for rhabdomyolysis , post mechanical; fall , and iv hydration , and monitor , icu eval , f/i cpk ,   on 11/17/18 treat for pneumonia with IV abx , will need to start anticoag when stable , consider laxatives for constipation   on 11/18/18 restart coumadin , check inr , plan rehab in am

## 2018-11-18 NOTE — PROGRESS NOTE ADULT - SUBJECTIVE AND OBJECTIVE BOX
[facility] -- Shar Thomas, Ananya, Latesha, Js Daigle Savella  Office # [phone]      Follow Up:  Rhabdo and abnormal ECG    Subjective/Observations: Seen and examined.  Sitting up in bed with no new complaints.  Pt denies cp, sob or palpitations.  No events overnight.  NAD.        REVIEW OF SYSTEMS: All other review of systems is negative unless indicated above    PAST MEDICAL & SURGICAL HISTORY:  Pacemaker  Atrial fibrillation  BPH (benign prostatic hypertrophy)  Hypertension  repair of fracture of right wrist with hardware  Cardiac pacemaker      MEDICATIONS  (STANDING):  ALBUTerol    90 MICROgram(s) HFA Inhaler 1 Puff(s) Inhalation every 4 hours  ALBUTerol/ipratropium for Nebulization 3 milliLiter(s) Nebulizer every 6 hours  amLODIPine   Tablet 10 milliGRAM(s) Oral daily  cefTRIAXone   IVPB 1 Gram(s) IV Intermittent every 24 hours  doxazosin 4 milliGRAM(s) Oral at bedtime  enoxaparin Injectable 40 milliGRAM(s) SubCutaneous daily  lidocaine   Patch 1 Patch Transdermal daily  pantoprazole  Injectable 40 milliGRAM(s) IV Push daily  tiotropium 18 MICROgram(s) Capsule 1 Capsule(s) Inhalation daily    MEDICATIONS  (PRN):  acetaminophen   Tablet .. 650 milliGRAM(s) Oral every 6 hours PRN Temp greater or equal to 38C (100.4F), Mild Pain (1 - 3)      Allergies    lactose (Diarrhea)  penicillin (Hives)    Intolerances            Vital Signs Last 24 Hrs  T(C): 37.7 (18 Nov 2018 07:58), Max: 37.7 (18 Nov 2018 07:58)  T(F): 99.8 (18 Nov 2018 07:58), Max: 99.8 (18 Nov 2018 07:58)  HR: 60 (18 Nov 2018 07:58) (58 - 70)  BP: 175/69 (18 Nov 2018 07:58) (156/66 - 185/75)  BP(mean): --  RR: 18 (18 Nov 2018 07:58) (17 - 19)  SpO2: 90% (18 Nov 2018 07:58) (90% - 96%)    I&O's Summary    17 Nov 2018 07:01  -  18 Nov 2018 07:00  --------------------------------------------------------  IN: 360 mL / OUT: 1250 mL / NET: -890 mL          PHYSICAL EXAM:  TELE: V-paced 50's  Constitutional: NAD, awake and alert, well-developed  HEENT: Moist Mucous Membranes, Anicteric  Pulmonary: Non-labored, breath sounds decreased R base, No wheezing, rales or rhonchi  Cardiovascular: Regular, S1 and S2, No murmurs, rubs, gallops or clicks  Gastrointestinal: Bowel Sounds present, soft, nontender.   Lymph: +2 peripheral edema ( chronic from venous insufficiency)   Skin: No visible rashes or ulcers.  Psych:  Mood & affect appropriate    LABS: All Labs Reviewed:    16 Nov 2018 07:14    143    |  106    |  17     ----------------------------<  98     3.5     |  30     |  0.76     Ca    7.7        16 Nov 2018 07:14        < from: 12 Lead ECG (11.12.18 @ 15:28) >  Ventricular Rate 67 BPM    Atrial Rate 70 BPM    QRS Duration 86 ms    Q-T Interval 432 ms    QTC Calculation(Bezet) 456 ms    R Axis 47 degrees    T Axis -74 degrees    Diagnosis Line Accelerated Junctional rhythm  ST & T wave abnormality, consider inferior ischemia  ST & T wave abnormality, consider anterolateral ischemia  Abnormal ECG    Confirmed by Christian Howell ([id number]) on 11/13/2018 10:34:32 AM    < end of copied text >    < from: Transthoracic Echocardiogram (09.06.18 @ 10:13) >  Patient name: LACIE SANCHEZ  YOB: 1935   Age: 83 (M)   MR#: [id number]  Study Date: 9/6/2018  Location: O/PSonographer: Annamarie Franks RDCS  Study quality: Technically fair  Referring Physician: JELENA TALBOT MD  Blood Pressure: 151/58 mmHg  Height: 173 cm  Weight: 103 kg  BSA: 2.2 m2  Heart Rate: 80 mmHg  ------------------------------------------------------------------------  PROCEDURE: Transthoracic echocardiogram with 2-D, M-Mode  and complete spectral and color flow Doppler.  INDICATION: Unspecified atrial fibrillation (I48.91)  ------------------------------------------------------------------------  Dimensions:    Normal Values:  LA:     5.0    2.0 - 4.0 cm  Ao:     3.4    2.0 - 3.8 cm  SEPTUM: 1.0    0.6 - 1.2 cm  PWT:    0.9    0.6 - 1.1 cm  LVIDd:  5.2    3.0 - 5.6 cm  LVIDs:  2.6    1.8 - 4.0 cm  Derived variables:  LVMI: 84 g/m2  RWT: 0.34  Fractional short: 50 %  EF (Visual Estimate): 60-65 %  EF (Modified Godinez Rule): 61 %Doppler Peak Velocity  (m/sec): AoV=1.6  ------------------------------------------------------------------------  Observations:  Mitral Valve: Mitral annular calcification, otherwise  normal mitral valve. Minimal mitral regurgitation.  Aortic Valve/Aorta: Aortic valve not well visualized;  appears calcified. Peak transaortic valve gradient equals  10 mm Hg. Minimal aortic regurgitation.  Peak left  ventricular outflow tract gradient equals 5 mm Hg.  Aortic Root: 3.4 cm.  Left Atrium: Severely dilated left atrium.  LA volume index  = 50 cc/m2.  Left Ventricle: Endocardium not well visualized; grossly  normal left ventricular systolic function. Normal left  ventricular internal dimensions and wall thicknesses.  Normal diastolic function  Right Heart: Right atrium not well visualized. The right  ventricle is not well visualized; grossly normal right  ventricular systolic function. A device wire is noted in  the right heart. Normal tricuspid valve. Mild tricuspid  regurgitation. Pulmonic valve not well visualized. Minimal  pulmonic regurgitation.  Pericardium/Pleura: Normal pericardium with trace  pericardial effusion.  Hemodynamic: Estimated right atrial pressure is 8 mm Hg.  Estimated right ventricular systolic pressure equals 49 mm  Hg, assuming right atrial pressure equals 8 mm Hg,  consistent with mild pulmonary hypertension.  ------------------------------------------------------------------------  Conclusions:  1. Mitral annular calcification, otherwise normal mitral  valve. Minimal mitral regurgitation.  2. Severely dilated left atrium.  LA volume index = 50  cc/m2.  3. Normal left ventricular internal dimensions and wall  thicknesses.  4. Endocardium not well visualized; grossly normal left  ventricular systolic function.  5. The right ventricle is not well visualized; grossly  normal right ventricular systolic function. A device wire  is noted in the right heart.  6. Estimated right ventricular systolic pressure equals 49  mm Hg, assuming right atrial pressure equals 8 mm Hg,  consistent with mild pulmonary hypertension.  *** Compared with echocardiogram of 9/9/2015, no  significant changes noted.  ------------------------------------------------------------------------  Confirmed on  9/6/2018 - 14:32:07 by Byron Garcia M.D.  ------------------------------------------------------------------------    < end of copied text >    < from: Xray Chest 1 View- PORTABLE-Urgent (11.15.18 @ 09:31) >  EXAM:  XR CHEST PORTABLE URGENT 1V                            PROCEDURE DATE:  11/15/2018          INTERPRETATION:  History: Pneumonia follow-up    Technique:  AP portable    Comparisons:  Chest x-ray dated 11/13/2018    Findings:     Airspace disease is present in the lower lobes particularly   in the retrocardiac space. Small left pleural effusion.  . Pacemaker   wires in right atrium and ventricle. Cardiomegaly.    The thorax is normal for age.    Impression: Cardiomegaly. Pacemaker. Bibasilar pneumonia, increased   density at lung bases. Small left pleural effusion                CAMPOS LIM M.D., ATTENDING RADIOLOGIST  This document has been electronically signed. Nov 15 2018 10:11AM        < end of copied text >

## 2018-11-18 NOTE — PROGRESS NOTE ADULT - ASSESSMENT
83 M with a history of HTN, AF on AC, PPM, BPH, chronic venous stasis p/w rhabdo and abn EKG. Found overnight with abnormal appearance of telemetry suggestive of undersensing. Interrogation revealed normal function with 3-6 month battery life, not pacer dependent    - despite abnormal telemetry appearance, pacemaker is functioning normally.  continue to monitor telemetery    - though remains edematous, there are no signs of ADHF. Lower ext edema chronic from venous insufficiency; no need for aggressive diuresis at this time.   - remains in af with backup . not on any rate control meds and none need to be started at this time  - -170's, first dose of Amlodipine ( home med) given this morning.   Will assess the response, afterward can resume the benazepril if needed  - Please continue to maintain strict I/Os, monitor daily weights, Cr, and K.   - Further cardiac workup will depend on clinical course.   - All other workup per primary team. Will followup.       Reyna Solano, NP-C  Cardiology

## 2018-11-19 ENCOUNTER — TRANSCRIPTION ENCOUNTER (OUTPATIENT)
Age: 83
End: 2018-11-19

## 2018-11-19 VITALS
TEMPERATURE: 98 F | HEART RATE: 77 BPM | SYSTOLIC BLOOD PRESSURE: 166 MMHG | RESPIRATION RATE: 20 BRPM | OXYGEN SATURATION: 95 % | DIASTOLIC BLOOD PRESSURE: 66 MMHG

## 2018-11-19 LAB
INR BLD: 1.32 RATIO — HIGH (ref 0.88–1.16)
PROTHROM AB SERPL-ACNC: 15 SEC — HIGH (ref 10–12.9)

## 2018-11-19 PROCEDURE — 84484 ASSAY OF TROPONIN QUANT: CPT

## 2018-11-19 PROCEDURE — 94760 N-INVAS EAR/PLS OXIMETRY 1: CPT

## 2018-11-19 PROCEDURE — 99221 1ST HOSP IP/OBS SF/LOW 40: CPT

## 2018-11-19 PROCEDURE — 97162 PT EVAL MOD COMPLEX 30 MIN: CPT

## 2018-11-19 PROCEDURE — 80069 RENAL FUNCTION PANEL: CPT

## 2018-11-19 PROCEDURE — 87150 DNA/RNA AMPLIFIED PROBE: CPT

## 2018-11-19 PROCEDURE — 83735 ASSAY OF MAGNESIUM: CPT

## 2018-11-19 PROCEDURE — 73522 X-RAY EXAM HIPS BI 3-4 VIEWS: CPT

## 2018-11-19 PROCEDURE — 74176 CT ABD & PELVIS W/O CONTRAST: CPT

## 2018-11-19 PROCEDURE — 85027 COMPLETE CBC AUTOMATED: CPT

## 2018-11-19 PROCEDURE — 87040 BLOOD CULTURE FOR BACTERIA: CPT

## 2018-11-19 PROCEDURE — 97112 NEUROMUSCULAR REEDUCATION: CPT

## 2018-11-19 PROCEDURE — 85610 PROTHROMBIN TIME: CPT

## 2018-11-19 PROCEDURE — 99285 EMERGENCY DEPT VISIT HI MDM: CPT | Mod: 25

## 2018-11-19 PROCEDURE — 82550 ASSAY OF CK (CPK): CPT

## 2018-11-19 PROCEDURE — 72131 CT LUMBAR SPINE W/O DYE: CPT

## 2018-11-19 PROCEDURE — 87086 URINE CULTURE/COLONY COUNT: CPT

## 2018-11-19 PROCEDURE — 76775 US EXAM ABDO BACK WALL LIM: CPT

## 2018-11-19 PROCEDURE — 81001 URINALYSIS AUTO W/SCOPE: CPT

## 2018-11-19 PROCEDURE — 71045 X-RAY EXAM CHEST 1 VIEW: CPT

## 2018-11-19 PROCEDURE — 94640 AIRWAY INHALATION TREATMENT: CPT

## 2018-11-19 PROCEDURE — 97530 THERAPEUTIC ACTIVITIES: CPT

## 2018-11-19 PROCEDURE — 96365 THER/PROPH/DIAG IV INF INIT: CPT

## 2018-11-19 PROCEDURE — 72110 X-RAY EXAM L-2 SPINE 4/>VWS: CPT

## 2018-11-19 PROCEDURE — 80053 COMPREHEN METABOLIC PANEL: CPT

## 2018-11-19 PROCEDURE — 97116 GAIT TRAINING THERAPY: CPT

## 2018-11-19 PROCEDURE — 80048 BASIC METABOLIC PNL TOTAL CA: CPT

## 2018-11-19 PROCEDURE — 99232 SBSQ HOSP IP/OBS MODERATE 35: CPT

## 2018-11-19 PROCEDURE — 93005 ELECTROCARDIOGRAM TRACING: CPT

## 2018-11-19 PROCEDURE — 87184 SC STD DISK METHOD PER PLATE: CPT

## 2018-11-19 PROCEDURE — 85730 THROMBOPLASTIN TIME PARTIAL: CPT

## 2018-11-19 PROCEDURE — 36415 COLL VENOUS BLD VENIPUNCTURE: CPT

## 2018-11-19 PROCEDURE — 83605 ASSAY OF LACTIC ACID: CPT

## 2018-11-19 PROCEDURE — 99231 SBSQ HOSP IP/OBS SF/LOW 25: CPT

## 2018-11-19 PROCEDURE — 82553 CREATINE MB FRACTION: CPT

## 2018-11-19 RX ORDER — AMLODIPINE BESYLATE 2.5 MG/1
1 TABLET ORAL
Qty: 0 | Refills: 0 | COMMUNITY
Start: 2018-11-19

## 2018-11-19 RX ORDER — AMLODIPINE BESYLATE AND BENAZEPRIL HYDROCHLORIDE 10; 20 MG/1; MG/1
1 CAPSULE ORAL
Qty: 0 | Refills: 0 | COMMUNITY

## 2018-11-19 RX ORDER — CEFUROXIME AXETIL 250 MG
500 TABLET ORAL EVERY 12 HOURS
Qty: 0 | Refills: 0 | Status: DISCONTINUED | OUTPATIENT
Start: 2018-11-19 | End: 2018-11-19

## 2018-11-19 RX ORDER — LISINOPRIL 2.5 MG/1
10 TABLET ORAL DAILY
Qty: 0 | Refills: 0 | Status: DISCONTINUED | OUTPATIENT
Start: 2018-11-19 | End: 2018-11-19

## 2018-11-19 RX ORDER — TIOTROPIUM BROMIDE 18 UG/1
1 CAPSULE ORAL; RESPIRATORY (INHALATION)
Qty: 0 | Refills: 0 | COMMUNITY
Start: 2018-11-19

## 2018-11-19 RX ORDER — CEFUROXIME AXETIL 250 MG
1 TABLET ORAL
Qty: 0 | Refills: 0 | COMMUNITY
Start: 2018-11-19

## 2018-11-19 RX ORDER — LIDOCAINE 4 G/100G
0 CREAM TOPICAL
Qty: 0 | Refills: 0 | COMMUNITY
Start: 2018-11-19

## 2018-11-19 RX ADMIN — PANTOPRAZOLE SODIUM 40 MILLIGRAM(S): 20 TABLET, DELAYED RELEASE ORAL at 11:19

## 2018-11-19 RX ADMIN — AMLODIPINE BESYLATE 10 MILLIGRAM(S): 2.5 TABLET ORAL at 05:33

## 2018-11-19 RX ADMIN — Medication 3 MILLILITER(S): at 14:02

## 2018-11-19 RX ADMIN — LIDOCAINE 1 PATCH: 4 CREAM TOPICAL at 00:39

## 2018-11-19 RX ADMIN — ENOXAPARIN SODIUM 40 MILLIGRAM(S): 100 INJECTION SUBCUTANEOUS at 11:20

## 2018-11-19 RX ADMIN — Medication 3 MILLILITER(S): at 07:59

## 2018-11-19 RX ADMIN — LIDOCAINE 1 PATCH: 4 CREAM TOPICAL at 09:35

## 2018-11-19 NOTE — PROGRESS NOTE ADULT - ATTENDING COMMENTS
The patient was personally seen and examined, in addition to being examined and evaluated by housestaff NP.  All elements of the note were edited where appropriate.
The patient was personally seen and examined, in addition to being examined and evaluated by NP.  All elements of the note were edited where appropriate.
The patient was personally seen and examined, in addition to being examined and evaluated by NP.  All elements of the note were edited where appropriate.
Chart reviewed    Patient seen and examined    Agree with plan as outlined above
Chart reviewed    Patient seen and examined in    Agree with plan as outlined above

## 2018-11-19 NOTE — DISCHARGE NOTE ADULT - CARE PROVIDER_API CALL
Donnell Boyd (DO), Internal Medicine  [address] [place] [address]  Phone: [phone]  Fax: [phone]

## 2018-11-19 NOTE — DISCHARGE NOTE ADULT - CARE PLAN
Principal Discharge DX:	Lobar pneumonia  Goal:	complete  po  antibiotic  Assessment and plan of treatment:	cxr in 1  week  Secondary Diagnosis:	Atrial fibrillation  Goal:	inr  needs to be  followed  Assessment and plan of treatment:	continue  coumadin  Secondary Diagnosis:	Back pain  Goal:	lidoderm  patch  Assessment and plan of treatment:	pt/ot in rehab

## 2018-11-19 NOTE — DISCHARGE NOTE ADULT - MEDICATION SUMMARY - MEDICATIONS TO STOP TAKING
I will STOP taking the medications listed below when I get home from the hospital:    amlodipine-benazepril 10 mg-20 mg oral capsule  -- 1 cap(s) by mouth once a day

## 2018-11-19 NOTE — PROGRESS NOTE ADULT - SUBJECTIVE AND OBJECTIVE BOX
NP Progress Note     [facility] -- Shar Thomas, Ananya, Latesha, Js Daigle Savella  Office # [phone]        Reason for Referral/Consultation:	elevated CPK, abn ekg	    HPI:  83 M with a history of HTN, AF on AC, PPM, BPH, chronic venous stasis p/w back pain. He was noted to have had a mechanical fall 2 days ago. He was sitting on the floor for 10-12 hours because of back pain. Then his back pain worsened and he came to ED. He has had chronic lower ext edema that has not worsened. Denies any chest pain, dyspnea, palpitations, PND, orthopnea, near syncope, syncope, stroke like symptoms. Currently feeling anxious.     EKG: AF with diffuse T wave inversions      Subjective and Objective: 	    CHIEF COMPLAINT: Patient is a 83y old  Male who presents with a chief complaint of fall abd back pain    HPI: 83 M with a history of HTN, AF on AC, PPM, BPH, chronic venous stasis p/w back pain. He was noted to have had a mechanical fall 2 days ago. He was sitting on the floor for 10-12 hours because of back pain. Then his back pain worsened and he came to ED. He has had chronic lower ext edema that has not worsened. Denies any chest pain, dyspnea, palpitations, PND, orthopnea, near syncope, syncope, stroke like symptoms. Currently feeling anxious.     EKG: AF with diffuse T wave inversions    REVIEW OF SYSTEMS:   All other review of systems are negative unless indicated above    PAST MEDICAL & SURGICAL HISTORY:  Pacemaker  Atrial fibrillation  BPH (benign prostatic hypertrophy)  Hypertension  repair of fracture of right wrist with hardware  Cardiac pacemaker (12 Nov 2018 19:01)        Subjective/Observations: Pt. seen and examined and evaluated. Pt. resting comfortably in bed in NAD, with no respiratory distress, no chest pain, dyspnea, palpitations, PND, or orthopnea.      REVIEW OF SYSTEMS: All other review of systems is negative unless indicated above    PAST MEDICAL & SURGICAL HISTORY:  Pacemaker  Atrial fibrillation  BPH (benign prostatic hypertrophy)  Hypertension  repair of fracture of right wrist with hardware  Cardiac pacemaker      MEDICATIONS  (STANDING):  ALBUTerol    90 MICROgram(s) HFA Inhaler 1 Puff(s) Inhalation every 4 hours  ALBUTerol/ipratropium for Nebulization 3 milliLiter(s) Nebulizer every 6 hours  amLODIPine   Tablet 10 milliGRAM(s) Oral daily  cefTRIAXone   IVPB 1 Gram(s) IV Intermittent every 24 hours  doxazosin 4 milliGRAM(s) Oral at bedtime  enoxaparin Injectable 40 milliGRAM(s) SubCutaneous daily  lidocaine   Patch 1 Patch Transdermal daily  pantoprazole  Injectable 40 milliGRAM(s) IV Push daily  tiotropium 18 MICROgram(s) Capsule 1 Capsule(s) Inhalation daily    MEDICATIONS  (PRN):  acetaminophen   Tablet .. 650 milliGRAM(s) Oral every 6 hours PRN Temp greater or equal to 38C (100.4F), Mild Pain (1 - 3)      Allergies: lactose (Diarrhea)  penicillin (Hives)      Vital Signs Last 24 Hrs  T(C): 37.1 (19 Nov 2018 05:07), Max: 37.7 (18 Nov 2018 07:58)  T(F): 98.7 (19 Nov 2018 05:07), Max: 99.8 (18 Nov 2018 07:58)  HR: 59 (19 Nov 2018 05:07) (59 - 72)  BP: 164/72 (19 Nov 2018 05:07) (142/63 - 181/68)  BP(mean): --  RR: 18 (19 Nov 2018 05:07) (17 - 19)  SpO2: 94% (19 Nov 2018 05:07) (90% - 97%)    I&O's Summary    17 Nov 2018 07:01  -  18 Nov 2018 07:00  --------------------------------------------------------  IN: 360 mL / OUT: 1250 mL / NET: -890 mL              LABS: All Labs Reviewed:             Echo:     Stress Testing:     Cath:    Imaging:    Interpretation of Telemetry:      Physical Exam:  Appearance: [ ] Normal  [ ] abnormal [ ] NAD   Eyes: [ ] PERRL [ ] EOMI  HEENT: [ ] Normal [ ] Abnormal oral mucosa [ ]NC/AT  Cardiovascular: [ ] S1 [ ] S2 [ ] RRR [ ] m/r/g [ ]edema [ ] JVP  Procedural Access Site: [ ]  hematoma [ ] tender to palpation [ ] 2+ pulse [ ] bruit [ ] Ecchymosis  Respiratory: [ ] Clear to auscultation bilaterally  Gastrointestinal: [ ] Soft [ ] tenderness[ ] distension [ ] BS  Musculoskeletal: [ ] clubbing [ ] joint deformity   Neurologic: [ ] Non-focal  Lymphatic: [ ] lymphadenopathy  Psychiatry: [ ] AAOx3  [ ] confused [ ] disoriented [ ] Mood & affect appropriate  Skin: [ ]  rashes [ ] ecchymoses [ ] cyanosis NP Progress Note     [facility] -- Shar Thomas, Ananya, Latesha, Js Daigle Savella  Office # [phone]        Reason for Referral/Consultation:	elevated CPK, abn ekg	    HPI:  83 M with a history of HTN, AF on AC, PPM, BPH, chronic venous stasis p/w back pain. He was noted to have had a mechanical fall 2 days ago. He was sitting on the floor for 10-12 hours because of back pain. Then his back pain worsened and he came to ED. He has had chronic lower ext edema that has not worsened. Denies any chest pain, dyspnea, palpitations, PND, orthopnea, near syncope, syncope, stroke like symptoms. Currently feeling anxious.     EKG: AF with diffuse T wave inversions      Subjective and Objective: 	    CHIEF COMPLAINT: Patient is a 83y old  Male who presents with a chief complaint of fall abd back pain    HPI: 83 M with a history of HTN, AF on AC, PPM, BPH, chronic venous stasis p/w back pain. He was noted to have had a mechanical fall 2 days ago. He was sitting on the floor for 10-12 hours because of back pain. Then his back pain worsened and he came to ED. He has had chronic lower ext edema that has not worsened. Denies any chest pain, dyspnea, palpitations, PND, orthopnea, near syncope, syncope, stroke like symptoms. Currently feeling anxious.     EKG: AF with diffuse T wave inversions    REVIEW OF SYSTEMS:   All other review of systems are negative unless indicated above    PAST MEDICAL & SURGICAL HISTORY:  Pacemaker  Atrial fibrillation  BPH (benign prostatic hypertrophy)  Hypertension  repair of fracture of right wrist with hardware  Cardiac pacemaker (12 Nov 2018 19:01)        Subjective/Observations: Pt. seen and examined and evaluated. Pt. resting comfortably in bed in NAD, with no respiratory distress, no chest pain, dyspnea, palpitations, PND, or orthopnea.      REVIEW OF SYSTEMS: All other review of systems is negative unless indicated above    PAST MEDICAL & SURGICAL HISTORY:  Pacemaker  Atrial fibrillation  BPH (benign prostatic hypertrophy)  Hypertension  repair of fracture of right wrist with hardware  Cardiac pacemaker      MEDICATIONS  (STANDING):  ALBUTerol    90 MICROgram(s) HFA Inhaler 1 Puff(s) Inhalation every 4 hours  ALBUTerol/ipratropium for Nebulization 3 milliLiter(s) Nebulizer every 6 hours  amLODIPine   Tablet 10 milliGRAM(s) Oral daily  cefTRIAXone   IVPB 1 Gram(s) IV Intermittent every 24 hours  doxazosin 4 milliGRAM(s) Oral at bedtime  enoxaparin Injectable 40 milliGRAM(s) SubCutaneous daily  lidocaine   Patch 1 Patch Transdermal daily  pantoprazole  Injectable 40 milliGRAM(s) IV Push daily  tiotropium 18 MICROgram(s) Capsule 1 Capsule(s) Inhalation daily    MEDICATIONS  (PRN):  acetaminophen   Tablet .. 650 milliGRAM(s) Oral every 6 hours PRN Temp greater or equal to 38C (100.4F), Mild Pain (1 - 3)      Allergies: lactose (Diarrhea)  penicillin (Hives)      Vital Signs Last 24 Hrs  T(C): 37.1 (19 Nov 2018 05:07), Max: 37.7 (18 Nov 2018 07:58)  T(F): 98.7 (19 Nov 2018 05:07), Max: 99.8 (18 Nov 2018 07:58)  HR: 59 (19 Nov 2018 05:07) (59 - 72)  BP: 164/72 (19 Nov 2018 05:07) (142/63 - 181/68)  BP(mean): --  RR: 18 (19 Nov 2018 05:07) (17 - 19)  SpO2: 94% (19 Nov 2018 05:07) (90% - 97%)    I&O's Summary    17 Nov 2018 07:01  -  18 Nov 2018 07:00  --------------------------------------------------------  IN: 360 mL / OUT: 1250 mL / NET: -890 mL              LABS: All Labs Reviewed:             Echo:   < from: Transthoracic Echocardiogram (09.06.18 @ 10:13) >  LVMI: 84 g/m2  RWT: 0.34  Fractional short: 50 %  EF (Visual Estimate): 60-65 %  EF (Modified Godinez Rule): 61 %Doppler Peak Velocity  (m/sec): AoV=1.6  ------------------------------------------------------------------------  Observations:  Mitral Valve: Mitral annular calcification, otherwise  normal mitral valve. Minimal mitral regurgitation.  Aortic Valve/Aorta: Aortic valve not well visualized;  appears calcified. Peak transaortic valve gradient equals  10 mm Hg. Minimal aortic regurgitation.  Peak left  ventricular outflow tract gradient equals 5 mm Hg.  Aortic Root: 3.4 cm.  Left Atrium: Severely dilated left atrium.  LA volume index  = 50 cc/m2.  Left Ventricle: Endocardium not well visualized; grossly  normal left ventricular systolic function. Normal left  ventricular internal dimensions and wall thicknesses.  Normal diastolic function  Right Heart: Right atrium not well visualized. The right  ventricle is not well visualized; grossly normal right  ventricular systolic function. A device wire is noted in  the right heart. Normal tricuspid valve. Mild tricuspid  regurgitation. Pulmonic valve not well visualized. Minimal  pulmonic regurgitation.  Pericardium/Pleura: Normal pericardium with trace  pericardial effusion.  Hemodynamic: Estimated right atrial pressure is 8 mm Hg.  Estimated right ventricular systolic pressure equals 49 mm  Hg, assuming right atrial pressure equals 8 mm Hg,  consistent with mild pulmonary hypertension.  ------------------------------------------------------------------------  Conclusions:  1. Mitral annular calcification, otherwise normal mitral  valve. Minimal mitral regurgitation.  2. Severely dilated left atrium.  LA volume index = 50  cc/m2.  3. Normal left ventricular internal dimensions and wall  thicknesses.  4. Endocardium not well visualized; grossly normal left  ventricular systolic function.  5. The right ventricle is not well visualized; grossly  normal right ventricular systolic function. A device wire  is noted in the right heart.  6. Estimated right ventricular systolic pressure equals 49  mm Hg, assuming right atrial pressure equals 8 mm Hg,  consistent with mild pulmonary hypertension.  *** Compared with echocardiogram of 9/9/2015, no  significant changes noted.      Stress Testing:     Cath:    Imaging:    Interpretation of Telemetry: Overnight on telemetry SR 90's-110's       Physical Exam:  Appearance: [ ] Normal  [ ] abnormal [X ] NAD   Eyes: [ ] PERRL [ ] EOMI  HEENT: [ ] Normal [ ] Abnormal oral mucosa [ ]NC/AT  Cardiovascular: [X ] S1 [X] S2 [ ] RRR [ ] m/r/g [X ]edema [ ] JVP  Procedural Access Site: [ ]  hematoma [ ] tender to palpation [ ] 2+ pulse [ ] bruit [ ] Ecchymosis  Respiratory: [X ] Clear to auscultation bilaterally  Gastrointestinal: [ ] Soft [ ] tenderness[ ] distension [ ] BS  Musculoskeletal: [ ] clubbing [ ] joint deformity   Neurologic: [ ] Non-focal  Lymphatic: [ ] lymphadenopathy  Psychiatry: [X ] AAOx3  [ ] confused [ ] disoriented [ ] Mood & affect appropriate  Skin: [ ]  rashes [ ] ecchymoses [ ] cyanosis

## 2018-11-19 NOTE — PROGRESS NOTE ADULT - SUBJECTIVE AND OBJECTIVE BOX
Patient is a 83y old  Male who presents with a chief complaint of fall , weakness , back pain (14 Nov 2018 09:38)      Patient seen in follow up for ARPITA. Stable renal function.     PAST MEDICAL HISTORY:  Pacemaker  Atrial fibrillation  BPH (benign prostatic hypertrophy)  Hypertension    MEDICATIONS  (STANDING):  ALBUTerol    90 MICROgram(s) HFA Inhaler 1 Puff(s) Inhalation every 4 hours  ALBUTerol/ipratropium for Nebulization 3 milliLiter(s) Nebulizer every 6 hours  amLODIPine   Tablet 10 milliGRAM(s) Oral daily  cefuroxime   Tablet 500 milliGRAM(s) Oral every 12 hours  doxazosin 4 milliGRAM(s) Oral at bedtime  enoxaparin Injectable 40 milliGRAM(s) SubCutaneous daily  lidocaine   Patch 1 Patch Transdermal daily  pantoprazole  Injectable 40 milliGRAM(s) IV Push daily  tiotropium 18 MICROgram(s) Capsule 1 Capsule(s) Inhalation daily    MEDICATIONS  (PRN):  acetaminophen   Tablet .. 650 milliGRAM(s) Oral every 6 hours PRN Temp greater or equal to 38C (100.4F), Mild Pain (1 - 3)    T(C): 36.9 (11-19-18 @ 07:45), Max: 37.7 (11-18-18 @ 07:58)  HR: 60 (11-19-18 @ 07:59) (58 - 72)  BP: 170/73 (11-19-18 @ 07:45) (142/63 - 185/75)  RR: 18 (11-19-18 @ 05:07)  SpO2: 92% (11-19-18 @ 07:59)  Wt(kg): --  I&O's Detail    19 Nov 2018 07:01  -  19 Nov 2018 11:32  --------------------------------------------------------  IN:  Total IN: 0 mL    OUT:    Voided: 225 mL  Total OUT: 225 mL    Total NET: -225 mL          PHYSICAL EXAM:  General: NAD  Respiratory: b/l air entry  Cardiovascular: S1 S2  Gastrointestinal: soft  Extremities:  edema                 LABORATORY:        Creatinine, Serum: 0.76 mg/dL (11.16.18 @ 07:14)

## 2018-11-19 NOTE — DISCHARGE NOTE ADULT - HOSPITAL COURSE
pt  fell at  home in er  x  rays show nop fx  but  pneumonia  seen and started on antibiotic  iv with good results now stable ojn po needs rehab for  fall

## 2018-11-19 NOTE — DISCHARGE NOTE ADULT - MEDICATION SUMMARY - MEDICATIONS TO TAKE
I will START or STAY ON the medications listed below when I get home from the hospital:    terazosin 5 mg oral tablet  -- orally once a day  -- Indication: For Bph    warfarin 4 mg oral tablet  -- 1 tab(s) by mouth once a day  -- Indication: For Atrial fibrillation    tiotropium 18 mcg inhalation capsule  -- 1 cap(s) inhaled once a day  -- Indication: For Lobar pneumonia    amLODIPine 10 mg oral tablet  -- 1 tab(s) by mouth once a day  -- Indication: For Atrial fibrillation    cefuroxime 500 mg oral tablet  -- 1 tab(s) by mouth every 12 hours  -- Indication: For Lobar pneumonia    lidocaine 5% topical film  --  on skin   -- Indication: For Back pain

## 2018-11-19 NOTE — DISCHARGE NOTE ADULT - PATIENT PORTAL LINK FT
You can access the [facility] Patient Portal, offered by [facility], by registering with the following website: [email or web address]

## 2018-11-19 NOTE — PROGRESS NOTE ADULT - ASSESSMENT
83 M with a history of HTN, AF on AC, PPM, BPH, chronic venous stasis p/w rhabdo and abn EKG. Found overnight with abnormal appearance of telemetry suggestive of undersensing. Interrogation revealed normal function with 3-6 month battery life, not pacer dependent    - despite abnormal telemetry appearance, pacemaker is functioning normally.  continue to monitor telemetery    - though remains edematous, there are no signs of ADHF. Lower ext edema chronic from venous insufficiency; no need for aggressive diuresis at this time.   - remains in af with backup . not on any rate control meds and none need to be started at this time  - -170's, first dose of Amlodipine ( home med) given this morning.   Will assess the response, afterward can resume the benazepril if needed  - Please continue to maintain strict I/Os, monitor daily weights, Cr, and K.   - Further cardiac workup will depend on clinical course.   - All other workup per primary team. Will followup. 83 M with a history of HTN, AF on AC, PPM, BPH, chronic venous stasis p/w rhabdo and abn EKG. Found overnight with abnormal appearance of telemetry suggestive of undersensing. Interrogation revealed normal function with 3-6 month battery life, not pacer dependent    - Pacemaker is functioning normally.  continue to monitor telemetery    - though remains edematous, there are no signs of ADHF. Lower ext edema chronic from venous insufficiency; no need for aggressive diuresis at this time.   - Pt. remains in af with backup . not on any rate control meds and none need to be started at this time  - Continue Amlodipine 10mg -185DBP 63-75  - Please continue to maintain strict I/Os, monitor daily weights, Cr, and K.   - Further cardiac workup will depend on clinical course.   - All other workup per primary team. Will followup.     Lakeisha Henry DNP, ANP-c  Cardiology 83 M with a history of HTN, AF on AC, PPM, BPH, chronic venous stasis p/w rhabdo and abn EKG. Found overnight with abnormal appearance of telemetry suggestive of undersensing. Interrogation revealed normal function with 3-6 month battery life, not pacer dependent    - Pacemaker is functioning normally.  continue to monitor telemetery    - though remains edematous, there are no signs of ADHF. Lower ext edema chronic from venous insufficiency; no need for aggressive diuresis at this time.   - Pt. remains in af with backup . not on any rate control meds and none need to be started at this time  - Continue Amlodipine 10mg -185DBP 63-75  - lisinopril 10mg/day restarted  - Please continue to maintain strict I/Os, monitor daily weights, Cr, and K.   - Further cardiac workup will depend on clinical course.   - All other workup per primary team. Will followup.     Lakeisha Henry DNP, ANP-c  Cardiology

## 2018-11-19 NOTE — PROGRESS NOTE ADULT - REASON FOR ADMISSION
fall , weakness , back pain

## 2019-01-09 ENCOUNTER — APPOINTMENT (OUTPATIENT)
Dept: ELECTROPHYSIOLOGY | Facility: CLINIC | Age: 84
End: 2019-01-09
Payer: MEDICARE

## 2019-01-09 VITALS
HEART RATE: 60 BPM | HEIGHT: 69 IN | BODY MASS INDEX: 33.33 KG/M2 | WEIGHT: 225 LBS | DIASTOLIC BLOOD PRESSURE: 62 MMHG | OXYGEN SATURATION: 95 % | SYSTOLIC BLOOD PRESSURE: 153 MMHG

## 2019-01-09 PROBLEM — Z95.0 PRESENCE OF CARDIAC PACEMAKER: Chronic | Status: ACTIVE | Noted: 2018-11-12

## 2019-01-09 PROBLEM — I48.91 UNSPECIFIED ATRIAL FIBRILLATION: Chronic | Status: ACTIVE | Noted: 2018-11-12

## 2019-01-09 PROCEDURE — 93279 PRGRMG DEV EVAL PM/LDLS PM: CPT

## 2019-03-11 ENCOUNTER — APPOINTMENT (OUTPATIENT)
Dept: ELECTROPHYSIOLOGY | Facility: CLINIC | Age: 84
End: 2019-03-11
Payer: MEDICARE

## 2019-03-11 VITALS
OXYGEN SATURATION: 96 % | BODY MASS INDEX: 33.77 KG/M2 | SYSTOLIC BLOOD PRESSURE: 147 MMHG | DIASTOLIC BLOOD PRESSURE: 65 MMHG | WEIGHT: 228 LBS | HEART RATE: 59 BPM | HEIGHT: 69 IN

## 2019-03-11 PROCEDURE — 93279 PRGRMG DEV EVAL PM/LDLS PM: CPT

## 2019-04-24 ENCOUNTER — OUTPATIENT (OUTPATIENT)
Dept: OUTPATIENT SERVICES | Facility: HOSPITAL | Age: 84
LOS: 1 days | End: 2019-04-24
Payer: MEDICARE

## 2019-04-24 VITALS
HEIGHT: 70 IN | WEIGHT: 223.11 LBS | SYSTOLIC BLOOD PRESSURE: 166 MMHG | OXYGEN SATURATION: 98 % | TEMPERATURE: 99 F | DIASTOLIC BLOOD PRESSURE: 71 MMHG | HEART RATE: 55 BPM | RESPIRATION RATE: 18 BRPM

## 2019-04-24 DIAGNOSIS — Z45.02 ENCOUNTER FOR ADJUSTMENT AND MANAGEMENT OF AUTOMATIC IMPLANTABLE CARDIAC DEFIBRILLATOR: ICD-10-CM

## 2019-04-24 LAB
ANION GAP SERPL CALC-SCNC: 10 MMOL/L — SIGNIFICANT CHANGE UP (ref 5–17)
APTT BLD: 34.6 SEC — SIGNIFICANT CHANGE UP (ref 27.5–36.3)
BUN SERPL-MCNC: 15 MG/DL — SIGNIFICANT CHANGE UP (ref 7–23)
CALCIUM SERPL-MCNC: 8.9 MG/DL — SIGNIFICANT CHANGE UP (ref 8.4–10.5)
CHLORIDE SERPL-SCNC: 104 MMOL/L — SIGNIFICANT CHANGE UP (ref 96–108)
CO2 SERPL-SCNC: 27 MMOL/L — SIGNIFICANT CHANGE UP (ref 22–31)
CREAT SERPL-MCNC: 0.94 MG/DL — SIGNIFICANT CHANGE UP (ref 0.5–1.3)
GLUCOSE SERPL-MCNC: 108 MG/DL — HIGH (ref 70–99)
HCT VFR BLD CALC: 33.5 % — LOW (ref 39–50)
HGB BLD-MCNC: 11.2 G/DL — LOW (ref 13–17)
INR BLD: 1.59 RATIO — HIGH (ref 0.88–1.16)
MCHC RBC-ENTMCNC: 29.7 PG — SIGNIFICANT CHANGE UP (ref 27–34)
MCHC RBC-ENTMCNC: 33.4 GM/DL — SIGNIFICANT CHANGE UP (ref 32–36)
MCV RBC AUTO: 89 FL — SIGNIFICANT CHANGE UP (ref 80–100)
PLATELET # BLD AUTO: 153 K/UL — SIGNIFICANT CHANGE UP (ref 150–400)
POTASSIUM SERPL-MCNC: 4.4 MMOL/L — SIGNIFICANT CHANGE UP (ref 3.5–5.3)
POTASSIUM SERPL-SCNC: 4.4 MMOL/L — SIGNIFICANT CHANGE UP (ref 3.5–5.3)
PROTHROM AB SERPL-ACNC: 18.6 SEC — HIGH (ref 10–12.9)
RBC # BLD: 3.76 M/UL — LOW (ref 4.2–5.8)
RBC # FLD: 13.6 % — SIGNIFICANT CHANGE UP (ref 10.3–14.5)
SODIUM SERPL-SCNC: 141 MMOL/L — SIGNIFICANT CHANGE UP (ref 135–145)
WBC # BLD: 5.9 K/UL — SIGNIFICANT CHANGE UP (ref 3.8–10.5)
WBC # FLD AUTO: 5.9 K/UL — SIGNIFICANT CHANGE UP (ref 3.8–10.5)

## 2019-04-24 PROCEDURE — C1785: CPT

## 2019-04-24 PROCEDURE — 85027 COMPLETE CBC AUTOMATED: CPT

## 2019-04-24 PROCEDURE — 33228 REMV&REPLC PM GEN DUAL LEAD: CPT

## 2019-04-24 PROCEDURE — 93005 ELECTROCARDIOGRAM TRACING: CPT

## 2019-04-24 PROCEDURE — 80048 BASIC METABOLIC PNL TOTAL CA: CPT

## 2019-04-24 PROCEDURE — 85730 THROMBOPLASTIN TIME PARTIAL: CPT

## 2019-04-24 PROCEDURE — 93010 ELECTROCARDIOGRAM REPORT: CPT

## 2019-04-24 PROCEDURE — 85610 PROTHROMBIN TIME: CPT

## 2019-04-24 RX ORDER — WARFARIN SODIUM 2.5 MG/1
1 TABLET ORAL
Qty: 0 | Refills: 0 | COMMUNITY

## 2019-04-24 RX ORDER — TERAZOSIN HYDROCHLORIDE 10 MG/1
0 CAPSULE ORAL
Qty: 0 | Refills: 0 | COMMUNITY

## 2019-04-24 RX ORDER — AMLODIPINE BESYLATE AND BENAZEPRIL HYDROCHLORIDE 10; 20 MG/1; MG/1
1 CAPSULE ORAL
Qty: 0 | Refills: 0 | COMMUNITY

## 2019-04-24 RX ORDER — TERAZOSIN HYDROCHLORIDE 10 MG/1
1 CAPSULE ORAL
Qty: 0 | Refills: 0 | COMMUNITY

## 2019-04-24 NOTE — H&P CARDIOLOGY - HISTORY OF PRESENT ILLNESS
This is an 82 yo M with a history of HTN, AF on Coumadin last dose...................., PPM, BPH, chronic venous stasis who presents for PPM generator change for KEENA. Pt. denies chest pain/pressure, SOB/HAAS, dizziness, diaphoresis, palpitations, nausea, vomiting, peripheral edema, recent weight gain, or syncope. This is an 82 yo M with a history of HTN, AF on Coumadin last dose 4/22/19, PPM, BPH, chronic venous stasis who presents for PPM generator change for KEENA. Pt. denies chest pain/pressure, SOB/HAAS, dizziness, diaphoresis, palpitations, nausea, vomiting, peripheral edema, recent weight gain, or syncope.

## 2019-05-10 ENCOUNTER — APPOINTMENT (OUTPATIENT)
Dept: ELECTROPHYSIOLOGY | Facility: CLINIC | Age: 84
End: 2019-05-10
Payer: MEDICARE

## 2019-05-10 VITALS
BODY MASS INDEX: 32.58 KG/M2 | HEART RATE: 60 BPM | HEIGHT: 69 IN | SYSTOLIC BLOOD PRESSURE: 167 MMHG | OXYGEN SATURATION: 96 % | DIASTOLIC BLOOD PRESSURE: 68 MMHG | WEIGHT: 220 LBS

## 2019-05-10 PROCEDURE — 99024 POSTOP FOLLOW-UP VISIT: CPT

## 2019-05-10 PROCEDURE — 93280 PM DEVICE PROGR EVAL DUAL: CPT

## 2019-08-19 ENCOUNTER — APPOINTMENT (OUTPATIENT)
Dept: ELECTROPHYSIOLOGY | Facility: CLINIC | Age: 84
End: 2019-08-19
Payer: MEDICARE

## 2019-08-19 PROCEDURE — 93296 REM INTERROG EVL PM/IDS: CPT

## 2019-08-19 PROCEDURE — 93294 REM INTERROG EVL PM/LDLS PM: CPT

## 2019-11-15 ENCOUNTER — APPOINTMENT (OUTPATIENT)
Dept: ELECTROPHYSIOLOGY | Facility: CLINIC | Age: 84
End: 2019-11-15
Payer: MEDICARE

## 2019-11-15 VITALS
HEART RATE: 60 BPM | DIASTOLIC BLOOD PRESSURE: 72 MMHG | OXYGEN SATURATION: 96 % | HEIGHT: 69 IN | SYSTOLIC BLOOD PRESSURE: 157 MMHG

## 2019-11-15 PROCEDURE — 93280 PM DEVICE PROGR EVAL DUAL: CPT

## 2020-02-14 ENCOUNTER — APPOINTMENT (OUTPATIENT)
Dept: ELECTROPHYSIOLOGY | Facility: CLINIC | Age: 85
End: 2020-02-14
Payer: MEDICARE

## 2020-02-14 PROCEDURE — 93294 REM INTERROG EVL PM/LDLS PM: CPT

## 2020-02-14 PROCEDURE — 93296 REM INTERROG EVL PM/IDS: CPT

## 2020-04-29 NOTE — CONSULT NOTE ADULT - ASSESSMENT
Resolving ARPITA (mild rhabdo/pre-renal/ARB/NSAID).  Clinical volume excess.  May have had cardiac ischemic event.  Bacteremia.  Will stop IVF, give Lasix 40 mg I.V., repeat portable chest XRay.  Monitor labs and urine output.  Antibiotics as per medicine.  Suggest Cardiology evaluation.  Will follow.  Thank you. Performing Laboratory: [id number] Performing Laboratory: [id number]

## 2020-05-22 ENCOUNTER — APPOINTMENT (OUTPATIENT)
Dept: ELECTROPHYSIOLOGY | Facility: CLINIC | Age: 85
End: 2020-05-22
Payer: MEDICARE

## 2020-05-22 PROCEDURE — 93296 REM INTERROG EVL PM/IDS: CPT

## 2020-05-22 PROCEDURE — 93294 REM INTERROG EVL PM/LDLS PM: CPT

## 2020-08-12 ENCOUNTER — APPOINTMENT (OUTPATIENT)
Dept: ELECTROPHYSIOLOGY | Facility: CLINIC | Age: 85
End: 2020-08-12
Payer: MEDICARE

## 2020-08-12 VITALS
BODY MASS INDEX: 34.07 KG/M2 | SYSTOLIC BLOOD PRESSURE: 145 MMHG | HEIGHT: 69 IN | HEART RATE: 79 BPM | DIASTOLIC BLOOD PRESSURE: 77 MMHG | OXYGEN SATURATION: 95 % | WEIGHT: 230 LBS

## 2020-08-12 DIAGNOSIS — I48.91 UNSPECIFIED ATRIAL FIBRILLATION: ICD-10-CM

## 2020-08-12 PROCEDURE — 99212 OFFICE O/P EST SF 10 MIN: CPT

## 2020-08-12 PROCEDURE — 93280 PM DEVICE PROGR EVAL DUAL: CPT

## 2020-08-12 RX ORDER — GLUCOSAM/CHONDRO/HERB 149/HYAL 750-100 MG
TABLET ORAL DAILY
Refills: 0 | Status: ACTIVE | COMMUNITY
Start: 2020-08-12

## 2020-08-13 NOTE — DISCUSSION/SUMMARY
[FreeTextEntry1] : Pacemaker output thresholds adjusted to improve battery longevity\par device is otherwise working normally\par 1 episode of NSVT 2.5s in 2/20, previously seen via remote, none since\par \par

## 2020-08-13 NOTE — HISTORY OF PRESENT ILLNESS
[FreeTextEntry1] : Mr. Chavez is doing well. No NSVT since 2/27. Continues in VVIR without symptoms. He is chronically on warfarin

## 2020-08-13 NOTE — PHYSICAL EXAM
[General Appearance - Well Developed] : well developed [General Appearance - Well Nourished] : well nourished [Eyelids - No Xanthelasma] : the eyelids demonstrated no xanthelasmas [Normal Conjunctiva] : the conjunctiva exhibited no abnormalities [Auscultation Breath Sounds / Voice Sounds] : lungs were clear to auscultation bilaterally [Lungs Percussion] : the lungs were normal to percussion [Bowel Sounds] : normal bowel sounds [Abdomen Soft] : soft [Cyanosis, Localized] : no localized cyanosis [Nail Clubbing] : no clubbing of the fingernails [Skin Color & Pigmentation] : normal skin color and pigmentation [Skin Turgor] : normal skin turgor [Impaired Insight] : insight and judgment were intact [No Venous Stasis] : no venous stasis [FreeTextEntry1] : normal S1, S2 paradoxcially split

## 2020-11-20 ENCOUNTER — APPOINTMENT (OUTPATIENT)
Dept: ELECTROPHYSIOLOGY | Facility: CLINIC | Age: 85
End: 2020-11-20
Payer: MEDICARE

## 2020-11-20 VITALS
DIASTOLIC BLOOD PRESSURE: 70 MMHG | WEIGHT: 225 LBS | HEIGHT: 69 IN | HEART RATE: 86 BPM | OXYGEN SATURATION: 96 % | SYSTOLIC BLOOD PRESSURE: 146 MMHG | BODY MASS INDEX: 33.33 KG/M2

## 2020-11-20 PROCEDURE — 93280 PM DEVICE PROGR EVAL DUAL: CPT

## 2020-11-20 RX ORDER — AMLODIPINE AND OLMESARTAN MEDOXOMIL 10; 20 MG/1; MG/1
10-20 TABLET ORAL
Refills: 0 | Status: ACTIVE | COMMUNITY
Start: 2020-11-20

## 2020-11-30 ENCOUNTER — APPOINTMENT (OUTPATIENT)
Dept: ELECTROPHYSIOLOGY | Facility: CLINIC | Age: 85
End: 2020-11-30

## 2021-02-22 ENCOUNTER — NON-APPOINTMENT (OUTPATIENT)
Age: 86
End: 2021-02-22

## 2021-02-22 ENCOUNTER — APPOINTMENT (OUTPATIENT)
Dept: ELECTROPHYSIOLOGY | Facility: CLINIC | Age: 86
End: 2021-02-22
Payer: MEDICARE

## 2021-02-22 PROCEDURE — 93296 REM INTERROG EVL PM/IDS: CPT

## 2021-02-22 PROCEDURE — 93294 REM INTERROG EVL PM/LDLS PM: CPT

## 2021-05-26 ENCOUNTER — NON-APPOINTMENT (OUTPATIENT)
Age: 86
End: 2021-05-26

## 2021-05-26 ENCOUNTER — APPOINTMENT (OUTPATIENT)
Dept: ELECTROPHYSIOLOGY | Facility: CLINIC | Age: 86
End: 2021-05-26
Payer: MEDICARE

## 2021-05-26 PROCEDURE — 93294 REM INTERROG EVL PM/LDLS PM: CPT

## 2021-05-26 PROCEDURE — 93296 REM INTERROG EVL PM/IDS: CPT

## 2021-08-25 ENCOUNTER — NON-APPOINTMENT (OUTPATIENT)
Age: 86
End: 2021-08-25

## 2021-08-25 ENCOUNTER — APPOINTMENT (OUTPATIENT)
Dept: ELECTROPHYSIOLOGY | Facility: CLINIC | Age: 86
End: 2021-08-25
Payer: MEDICARE

## 2021-08-25 PROCEDURE — 93294 REM INTERROG EVL PM/LDLS PM: CPT

## 2021-08-25 PROCEDURE — 93296 REM INTERROG EVL PM/IDS: CPT

## 2021-09-17 ENCOUNTER — NON-APPOINTMENT (OUTPATIENT)
Age: 86
End: 2021-09-17

## 2021-09-17 ENCOUNTER — APPOINTMENT (OUTPATIENT)
Dept: ELECTROPHYSIOLOGY | Facility: CLINIC | Age: 86
End: 2021-09-17
Payer: MEDICARE

## 2021-09-17 VITALS — SYSTOLIC BLOOD PRESSURE: 153 MMHG | OXYGEN SATURATION: 96 % | HEART RATE: 59 BPM | DIASTOLIC BLOOD PRESSURE: 70 MMHG

## 2021-09-17 PROCEDURE — 93280 PM DEVICE PROGR EVAL DUAL: CPT

## 2021-09-17 PROCEDURE — 93000 ELECTROCARDIOGRAM COMPLETE: CPT | Mod: 59

## 2021-11-24 ENCOUNTER — NON-APPOINTMENT (OUTPATIENT)
Age: 86
End: 2021-11-24

## 2021-11-24 ENCOUNTER — APPOINTMENT (OUTPATIENT)
Dept: ELECTROPHYSIOLOGY | Facility: CLINIC | Age: 86
End: 2021-11-24
Payer: MEDICARE

## 2021-11-24 PROCEDURE — 93294 REM INTERROG EVL PM/LDLS PM: CPT | Mod: NC

## 2021-11-24 PROCEDURE — 93296 REM INTERROG EVL PM/IDS: CPT

## 2022-03-17 ENCOUNTER — APPOINTMENT (OUTPATIENT)
Dept: ELECTROPHYSIOLOGY | Facility: CLINIC | Age: 87
End: 2022-03-17
Payer: MEDICARE

## 2022-03-17 ENCOUNTER — NON-APPOINTMENT (OUTPATIENT)
Age: 87
End: 2022-03-17

## 2022-03-17 PROCEDURE — 93296 REM INTERROG EVL PM/IDS: CPT

## 2022-03-17 PROCEDURE — 93294 REM INTERROG EVL PM/LDLS PM: CPT

## 2022-06-17 ENCOUNTER — NON-APPOINTMENT (OUTPATIENT)
Age: 87
End: 2022-06-17

## 2022-06-17 ENCOUNTER — APPOINTMENT (OUTPATIENT)
Dept: ELECTROPHYSIOLOGY | Facility: CLINIC | Age: 87
End: 2022-06-17

## 2022-06-17 PROCEDURE — 93296 REM INTERROG EVL PM/IDS: CPT

## 2022-06-17 PROCEDURE — 93294 REM INTERROG EVL PM/LDLS PM: CPT

## 2022-06-21 NOTE — DISCHARGE NOTE ADULT - PLAN OF CARE
Addended by: QUINTEN CRUZ on: 6/21/2022 07:15 AM     Modules accepted: Orders     complete  po  antibiotic cxr in 1  week inr  needs to be  followed continue  coumadin lidoderm  patch pt/ot in rehab

## 2022-09-29 ENCOUNTER — APPOINTMENT (OUTPATIENT)
Dept: ELECTROPHYSIOLOGY | Facility: CLINIC | Age: 87
End: 2022-09-29

## 2022-09-29 ENCOUNTER — NON-APPOINTMENT (OUTPATIENT)
Age: 87
End: 2022-09-29

## 2022-09-29 VITALS — HEART RATE: 61 BPM | DIASTOLIC BLOOD PRESSURE: 67 MMHG | SYSTOLIC BLOOD PRESSURE: 145 MMHG | OXYGEN SATURATION: 96 %

## 2022-09-29 PROCEDURE — 93280 PM DEVICE PROGR EVAL DUAL: CPT

## 2022-09-29 PROCEDURE — 93000 ELECTROCARDIOGRAM COMPLETE: CPT | Mod: 59

## 2022-12-27 ENCOUNTER — NON-APPOINTMENT (OUTPATIENT)
Age: 87
End: 2022-12-27

## 2022-12-27 ENCOUNTER — APPOINTMENT (OUTPATIENT)
Dept: ELECTROPHYSIOLOGY | Facility: CLINIC | Age: 87
End: 2022-12-27
Payer: MEDICARE

## 2022-12-27 PROCEDURE — 93296 REM INTERROG EVL PM/IDS: CPT

## 2022-12-27 PROCEDURE — 93294 REM INTERROG EVL PM/LDLS PM: CPT

## 2023-03-28 ENCOUNTER — NON-APPOINTMENT (OUTPATIENT)
Age: 88
End: 2023-03-28

## 2023-03-28 ENCOUNTER — APPOINTMENT (OUTPATIENT)
Dept: ELECTROPHYSIOLOGY | Facility: CLINIC | Age: 88
End: 2023-03-28
Payer: MEDICARE

## 2023-03-28 PROCEDURE — 93294 REM INTERROG EVL PM/LDLS PM: CPT

## 2023-03-28 PROCEDURE — 93296 REM INTERROG EVL PM/IDS: CPT

## 2023-06-27 ENCOUNTER — NON-APPOINTMENT (OUTPATIENT)
Age: 88
End: 2023-06-27

## 2023-06-27 ENCOUNTER — APPOINTMENT (OUTPATIENT)
Dept: ELECTROPHYSIOLOGY | Facility: CLINIC | Age: 88
End: 2023-06-27
Payer: MEDICARE

## 2023-06-27 PROCEDURE — 93296 REM INTERROG EVL PM/IDS: CPT

## 2023-06-27 PROCEDURE — 93294 REM INTERROG EVL PM/LDLS PM: CPT

## 2023-09-29 ENCOUNTER — APPOINTMENT (OUTPATIENT)
Dept: ELECTROPHYSIOLOGY | Facility: CLINIC | Age: 88
End: 2023-09-29
Payer: MEDICARE

## 2023-09-29 VITALS — HEART RATE: 63 BPM | SYSTOLIC BLOOD PRESSURE: 159 MMHG | DIASTOLIC BLOOD PRESSURE: 55 MMHG | OXYGEN SATURATION: 95 %

## 2023-09-29 PROCEDURE — ZZZZZ: CPT

## 2023-10-10 NOTE — PROGRESS NOTE ADULT - PROVIDER SPECIALTY LIST ADULT
Continue home medications as needed  Follow up with pcp in 2 to 3 days  Return to ED for any worsening pain symptoms or concerns.      Internal Medicine

## 2023-12-29 ENCOUNTER — NON-APPOINTMENT (OUTPATIENT)
Age: 88
End: 2023-12-29

## 2023-12-29 ENCOUNTER — APPOINTMENT (OUTPATIENT)
Dept: ELECTROPHYSIOLOGY | Facility: CLINIC | Age: 88
End: 2023-12-29
Payer: MEDICARE

## 2023-12-29 PROCEDURE — 93296 REM INTERROG EVL PM/IDS: CPT

## 2023-12-29 PROCEDURE — 93294 REM INTERROG EVL PM/LDLS PM: CPT

## 2024-03-29 ENCOUNTER — APPOINTMENT (OUTPATIENT)
Dept: ELECTROPHYSIOLOGY | Facility: CLINIC | Age: 89
End: 2024-03-29

## 2024-08-03 ENCOUNTER — NON-APPOINTMENT (OUTPATIENT)
Age: 89
End: 2024-08-03

## 2024-10-04 ENCOUNTER — APPOINTMENT (OUTPATIENT)
Dept: ELECTROPHYSIOLOGY | Facility: CLINIC | Age: 89
End: 2024-10-04